# Patient Record
Sex: FEMALE | Race: WHITE | Employment: OTHER | URBAN - METROPOLITAN AREA
[De-identification: names, ages, dates, MRNs, and addresses within clinical notes are randomized per-mention and may not be internally consistent; named-entity substitution may affect disease eponyms.]

---

## 2023-10-11 RX ORDER — IRBESARTAN 150 MG/1
1 TABLET ORAL DAILY
COMMUNITY
Start: 2023-08-10

## 2023-10-11 RX ORDER — GLYCOPYRROLATE 2 MG/1
2 TABLET ORAL DAILY
COMMUNITY

## 2023-10-11 RX ORDER — DIMENHYDRINATE 50 MG
TABLET ORAL DAILY
COMMUNITY

## 2023-10-11 RX ORDER — CHOLECALCIFEROL (VITAMIN D3) 125 MCG
CAPSULE ORAL
COMMUNITY

## 2023-10-11 RX ORDER — CHLORAL HYDRATE 500 MG
CAPSULE ORAL
COMMUNITY

## 2023-10-11 RX ORDER — GABAPENTIN 300 MG/1
300 CAPSULE ORAL DAILY
Qty: 30 CAPSULE | Refills: 2 | COMMUNITY
Start: 2023-09-13 | End: 2023-12-12

## 2023-10-11 RX ORDER — PANTOPRAZOLE SODIUM 40 MG/1
40 TABLET, DELAYED RELEASE ORAL
COMMUNITY

## 2023-10-11 RX ORDER — ONDANSETRON 4 MG/1
4 TABLET, FILM COATED ORAL EVERY 8 HOURS PRN
COMMUNITY
Start: 2023-09-05

## 2023-10-11 RX ORDER — BUSPIRONE HYDROCHLORIDE 10 MG/1
1 TABLET ORAL 2 TIMES DAILY
COMMUNITY
Start: 2023-04-21

## 2023-10-11 RX ORDER — ZOLPIDEM TARTRATE 10 MG/1
10 TABLET ORAL NIGHTLY
Qty: 30 TABLET | Refills: 2 | COMMUNITY
Start: 2023-08-07 | End: 2023-11-05

## 2023-10-11 NOTE — PROGRESS NOTES
[facility] ENDOSCOPY COLONOSCOPY PRE-OPERATIVE INSTRUCTIONS    Procedure date_10/18/2023________Arrival time__0800__________        Surgery time___0900_________       Clear liquids the day before the procedure. Do not eat or drink anything within 5 hours of your procedure. This includes water chewing gum, mints and ice chips. You may brush your teeth and gargle the morning of your surgery, but do not swallow the water    You may be asked to stop blood thinners such as Coumadin, Plavix, Fragmin, Lovenox, etc., or any anti-inflammatories such as:  Aspirin, Ibuprofen, Advil, Naproxen prior to your procedure. We also ask that you stop any OTC medications such as fish oil, vitamin E, glucosamine, garlic, Multivitamins, COQ 10, etc.    You must make arrangements for a responsible adult to arrive with you and stay in our waiting area during your procedure. They will also need to take you home after your procedure. For your safety you will not be allowed to leave alone or drive yourself home. Also for your safety, it is strongly suggested that someone stay with you the first 24 hours after your procedure. For your comfort, please wear simple loose fitting clothing to the center. Please do not bring valuables. If you have a living will and a durable power of  for healthcare, please bring in a copy.      You will need to bring a photo ID and insurance card    Our goal is to provide you with excellent care so if you have any questions, please contact us at the [facility] at [phone]         Please note these are generalized instructions for all colonoscopy cases, you may be provided with more specific instructions if necessary

## 2023-10-17 ENCOUNTER — ANESTHESIA EVENT (OUTPATIENT)
Dept: ENDOSCOPY | Age: 73
End: 2023-10-17
Payer: MEDICARE

## 2023-10-18 ENCOUNTER — ANESTHESIA (OUTPATIENT)
Dept: ENDOSCOPY | Age: 73
End: 2023-10-18
Payer: MEDICARE

## 2023-10-18 ENCOUNTER — HOSPITAL ENCOUNTER (OUTPATIENT)
Age: 73
Setting detail: OUTPATIENT SURGERY
Discharge: HOME | End: 2023-10-18
Attending: INTERNAL MEDICINE | Admitting: INTERNAL MEDICINE
Payer: MEDICARE

## 2023-10-18 VITALS
RESPIRATION RATE: 17 BRPM | SYSTOLIC BLOOD PRESSURE: 104 MMHG | TEMPERATURE: 97 F | HEIGHT: 62 IN | WEIGHT: 151.01 LBS | DIASTOLIC BLOOD PRESSURE: 92 MMHG | OXYGEN SATURATION: 97 % | BODY MASS INDEX: 27.79 KG/M2 | HEART RATE: 91 BPM

## 2023-10-18 DIAGNOSIS — K58.2 IRRITABLE BOWEL SYNDROME WITH BOTH CONSTIPATION AND DIARRHEA: ICD-10-CM

## 2023-10-18 DIAGNOSIS — K21.9 GASTROESOPHAGEAL REFLUX DISEASE, UNSPECIFIED WHETHER ESOPHAGITIS PRESENT: ICD-10-CM

## 2023-10-18 PROCEDURE — 3700000001 HC ADD 15 MINUTES (ANESTHESIA): Performed by: INTERNAL MEDICINE

## 2023-10-18 PROCEDURE — 7100000011 HC PHASE II RECOVERY - ADDTL 15 MIN: Performed by: INTERNAL MEDICINE

## 2023-10-18 PROCEDURE — 7100000010 HC PHASE II RECOVERY - FIRST 15 MIN: Performed by: INTERNAL MEDICINE

## 2023-10-18 PROCEDURE — 3609010600 HC COLONOSCOPY POLYPECTOMY SNARE/COLD BIOPSY: Performed by: INTERNAL MEDICINE

## 2023-10-18 PROCEDURE — 6360000002 HC RX W HCPCS: Performed by: NURSE ANESTHETIST, CERTIFIED REGISTERED

## 2023-10-18 PROCEDURE — 2580000003 HC RX 258: Performed by: ANESTHESIOLOGY

## 2023-10-18 PROCEDURE — 3700000000 HC ANESTHESIA ATTENDED CARE: Performed by: INTERNAL MEDICINE

## 2023-10-18 PROCEDURE — 88305 TISSUE EXAM BY PATHOLOGIST: CPT

## 2023-10-18 PROCEDURE — 3609012400 HC EGD TRANSORAL BIOPSY SINGLE/MULTIPLE: Performed by: INTERNAL MEDICINE

## 2023-10-18 PROCEDURE — 2709999900 HC NON-CHARGEABLE SUPPLY: Performed by: INTERNAL MEDICINE

## 2023-10-18 RX ORDER — SODIUM CHLORIDE 0.9 % (FLUSH) 0.9 %
5-40 SYRINGE (ML) INJECTION PRN
Status: DISCONTINUED | OUTPATIENT
Start: 2023-10-18 | End: 2023-10-18 | Stop reason: HOSPADM

## 2023-10-18 RX ORDER — SODIUM CHLORIDE 0.9 % (FLUSH) 0.9 %
5-40 SYRINGE (ML) INJECTION EVERY 12 HOURS SCHEDULED
Status: DISCONTINUED | OUTPATIENT
Start: 2023-10-18 | End: 2023-10-18 | Stop reason: HOSPADM

## 2023-10-18 RX ORDER — MIDAZOLAM HYDROCHLORIDE 1 MG/ML
INJECTION INTRAMUSCULAR; INTRAVENOUS PRN
Status: DISCONTINUED | OUTPATIENT
Start: 2023-10-18 | End: 2023-10-18 | Stop reason: SDUPTHER

## 2023-10-18 RX ORDER — SODIUM CHLORIDE 9 MG/ML
INJECTION, SOLUTION INTRAVENOUS PRN
Status: DISCONTINUED | OUTPATIENT
Start: 2023-10-18 | End: 2023-10-18 | Stop reason: HOSPADM

## 2023-10-18 RX ORDER — PROPOFOL 10 MG/ML
INJECTION, EMULSION INTRAVENOUS CONTINUOUS PRN
Status: DISCONTINUED | OUTPATIENT
Start: 2023-10-18 | End: 2023-10-18 | Stop reason: SDUPTHER

## 2023-10-18 RX ADMIN — MIDAZOLAM 1 MG: 1 INJECTION INTRAMUSCULAR; INTRAVENOUS at 09:10

## 2023-10-18 RX ADMIN — MIDAZOLAM 1 MG: 1 INJECTION INTRAMUSCULAR; INTRAVENOUS at 09:05

## 2023-10-18 RX ADMIN — SODIUM CHLORIDE: 9 INJECTION, SOLUTION INTRAVENOUS at 08:38

## 2023-10-18 RX ADMIN — PROPOFOL 250 MCG/KG/MIN: 10 INJECTION, EMULSION INTRAVENOUS at 09:10

## 2023-10-18 ASSESSMENT — PAIN - FUNCTIONAL ASSESSMENT: PAIN_FUNCTIONAL_ASSESSMENT: 0-10

## 2023-10-18 NOTE — ANESTHESIA PRE PROCEDURE
ASA 2     (Discussed risks and benefits to sedation including nausea, vomiting, allergic reaction, headache, delayed cognitive recovery, stroke, heart attack, respiratory depression, and death which patient understood and agreed to proceed. The patient was given the opportunity to ask questions and all questions were answered to the patient's satisfaction.  )  Induction: intravenous. Anesthetic plan and risks discussed with patient. Plan discussed with CRNA. This pre-anesthesia assessment may be used as a history and physical.    DOS STAFF ADDENDUM:    Pt seen and examined, chart reviewed (including anesthesia, drug and allergy history). No interval changes to history and physical examination. Anesthetic plan, risks, benefits, alternatives, and personnel involved discussed with patient. Patient verbalized an understanding and agrees to proceed.       Scott Browne MD  October 18, 2023  8:48 AM

## 2023-10-18 NOTE — ANESTHESIA POSTPROCEDURE EVALUATION
Department of Anesthesiology  Postprocedure Note    Patient: Gwen Jacome  MRN: [id number]  YOB: 1950  Date of evaluation: 10/18/2023      Procedure Summary     Date: 10/18/23 Room / Location: [address] [place] [address]    Anesthesia Start: 9302 Anesthesia Stop: 0944    Procedures:       COLONOSCOPY POLYPECTOMY SNARE/COLD BIOPSY      EGD BIOPSY Diagnosis:       Irritable bowel syndrome with both constipation and diarrhea      Gastroesophageal reflux disease, unspecified whether esophagitis present      (Irritable bowel syndrome with both constipation and diarrhea [K58.2])      (Gastroesophageal reflux disease, unspecified whether esophagitis present [K21.9])    Surgeons: Angie Juarez MD Responsible Provider: Debra Reid MD    Anesthesia Type: MAC ASA Status: 2          Anesthesia Type: No value filed. Apolonia Phase I: Apolonia Score: 10    Apolonia Phase II: Apolonia Score: 10      Anesthesia Post Evaluation    Patient location during evaluation: PACU  Patient participation: complete - patient participated  Level of consciousness: awake  Airway patency: patent  Nausea & Vomiting: no nausea and no vomiting  Cardiovascular status: blood pressure returned to baseline  Respiratory status: acceptable  Hydration status: stable  Comments: Vital signs stable  OK to discharge from Stage I post anesthesia care.   Care transferred from Anesthesiology department on discharge from perioperative area   Multimodal analgesia pain management approach  Pain management: satisfactory to patient

## 2023-10-18 NOTE — DISCHARGE INSTRUCTIONS
We removed 1 small polyp. You also have small hemorrhoids and some redness in the stomach. Recommendations:   Await pathology results   Due to age, do not recommend further screening colonoscopies   Continue current medications and resume diet as tolerated   Follow up in the office in 4 weeks       Endoscopy Discharge Instructions      You may be drowsy or lightheaded after receiving sedation. DO NOT operate  a vehicle (automobile, bicycle, motorcycle, machinery, or power tools), no  alcoholic beverages, and do not make any important decisions today. Plan on bed rest or quiet relaxation today. Resume normal activities in the morning. Resume normal activity tomorrow unless otherwise advised by your physician. Eat a light first meal, avoiding spicy and fatty foods, then resume normal diet unless  you are told otherwise by your physician. If the intravenous medication site is painful, apply warm compresses on the site until the soreness is relieved and elevate the arm above the heart. Call your physician if no improvement  in 2-3 days. POSSIBLE SYMPTOMS TO WATCH:     1. fever (greater than 100) 5. increased abdominal bloating   2. severe pain   6. excessive bleeding   3. nausea and vomiting  7. chest pain   4. chills    8. shortness of breath       Expected as normal and remedies:  Sore throat: use over the counter throat lozenges or gargle with warm salt water. Redness or soreness at the IV site: apply warm compress  Gaseous discomfort: belching or passing flatus (gas). Discharge Instructions for Colonoscopy     Colonoscopy is a visual exam of the lining of the large intestine, also called the bowel or colon, with a colonoscope. A colonoscope is a flexible tube with a light and a viewing device. It allows the doctor to view the inside of the colon through a tiny video camera.      Colonoscopy is performed for many reasons: unexplained anemia , pain,

## 2023-10-18 NOTE — H&P
Pre-operative History and Physical    Patient: Val Shelton  : 1950  Acct#:     Intended Procedure:  EGD and colonoscopy     HISTORY OF PRESENT ILLNESS:  The patient is a 75 y.o. female  who presents for/due to history of colonic polyps, and recurrent belching/reflux. Past Medical History:        Diagnosis Date    Acid reflux     Depression     Hypertension     IBS (irritable bowel syndrome)      Past Surgical History:        Procedure Laterality Date    BACK SURGERY      cervical    BREAST SURGERY      lumpectomy    COLONOSCOPY      FOOT SURGERY Left     HYSTERECTOMY (CERVIX STATUS UNKNOWN)      SHOULDER SURGERY Right     rotator cuff     Medications Prior to Admission:   No current facility-administered medications on file prior to encounter. Current Outpatient Medications on File Prior to Encounter   Medication Sig Dispense Refill    busPIRone (BUSPAR) 10 MG tablet Take 1 tablet by mouth 2 times daily      gabapentin (NEURONTIN) 300 MG capsule Take 1 capsule by mouth daily. 30 capsule 2    irbesartan (AVAPRO) 150 MG tablet Take 1 tablet by mouth daily      ondansetron (ZOFRAN) 4 MG tablet Take 1 tablet by mouth every 8 hours as needed      zolpidem (AMBIEN) 10 MG tablet Take 1 tablet by mouth nightly.  30 tablet 2    Valbenazine Tosylate 40 MG CAPS Take 1 capsule by mouth daily      METAMUCIL FIBER PO Take by mouth      Ascorbic Acid (VITAMIN C) 500 MG CHEW Take 1 tablet by mouth daily      Calcium Carb-Cholecalciferol 600-20 MG-MCG CHEW Take by mouth Every Day      cyanocobalamin 500 MCG tablet Take 1 tablet by mouth once a week      Flaxseed, Linseed, (FLAX SEED OIL) 1000 MG CAPS Take by mouth daily      glycopyrrolate (ROBINUL) 2 MG tablet Take 1 tablet by mouth daily      melatonin 5 MG TABS tablet Take by mouth      pantoprazole (PROTONIX) 40 MG tablet Take 1 tablet by mouth      Omega-3 Fatty Acids (FISH OIL) 1000 MG capsule Take by mouth Every Day          Allergies:

## 2023-10-18 NOTE — OP NOTE
are described below. Appropriate photo documentation was obtained. Terminal ileum:  Normal ileum    The scope was then withdrawn back through the cecum, ascending, transverse, descending, sigmoid colon, and rectum. Careful circumferential examination of the mucosa in these areas demonstrated:    - 3mm sessile polyp In the sigmoid colon removed with cold snare polypectomy       Rectum: The scope was then withdrawn into the rectum and retroflexed. The retroflexed view of the anal verge and rectum demonstrates small internal and external hemorrhoids . The scope was straightened, the colon was decompressed and the scope was withdrawn from the patient. The patient tolerated the procedure well and was taken to the PACU in good condition. Estimated blood loss:  None=    ID Type Source Tests Collected by Time Destination   A : A. Small bowel bx Tissue Tissue SURGICAL PATHOLOGY Maricarmen Bazan MD 10/18/2023 5745    B : B.  Gastric bx Tissue Tissue SURGICAL PATHOLOGY Maricarmen Bazan MD 10/18/2023 0262    C : C. GE junction Tissue Tissue SURGICAL PATHOLOGY Maricarmen Bazan MD 10/18/2023 0919        Impression:    1) See post procedure diagnoses    Recommendations:   Await pathology results   Due to age, do not recommend further screening colonoscopies   Continue current medications and resume diet as tolerated   Follow up in the office in 4 weeks       Maricarmen Bazan MD  [address] [facility] and [address]

## 2024-10-06 ENCOUNTER — APPOINTMENT (OUTPATIENT)
Dept: GENERAL RADIOLOGY | Age: 74
End: 2024-10-06
Payer: MEDICARE

## 2024-10-06 ENCOUNTER — APPOINTMENT (OUTPATIENT)
Dept: CT IMAGING | Age: 74
End: 2024-10-06
Payer: MEDICARE

## 2024-10-06 ENCOUNTER — HOSPITAL ENCOUNTER (INPATIENT)
Age: 74
LOS: 5 days | Discharge: NURSING, REHAB OR LONG-TERM CARE | End: 2024-10-11
Attending: EMERGENCY MEDICINE | Admitting: INTERNAL MEDICINE
Payer: MEDICARE

## 2024-10-06 DIAGNOSIS — E87.1 HYPONATREMIA: ICD-10-CM

## 2024-10-06 DIAGNOSIS — S82.891A CLOSED FRACTURE DISLOCATION OF RIGHT ANKLE, INITIAL ENCOUNTER: Primary | ICD-10-CM

## 2024-10-06 PROBLEM — I10 ESSENTIAL HYPERTENSION: Status: ACTIVE | Noted: 2024-10-06

## 2024-10-06 LAB
ALBUMIN SERPL-MCNC: 4.4 G/DL (ref 3.4–5)
ALBUMIN/GLOB SERPL: 1.6 {RATIO} (ref 1.1–2.2)
ALP SERPL-CCNC: 74 U/L (ref 40–129)
ALT SERPL-CCNC: 18 U/L (ref 10–40)
ANION GAP SERPL CALCULATED.3IONS-SCNC: 11 MMOL/L (ref 3–16)
ANION GAP SERPL CALCULATED.3IONS-SCNC: 15 MMOL/L (ref 3–16)
ANION GAP SERPL CALCULATED.3IONS-SCNC: 17 MMOL/L (ref 3–16)
AST SERPL-CCNC: 27 U/L (ref 15–37)
BACTERIA URNS QL MICRO: ABNORMAL /HPF
BACTERIA URNS QL MICRO: ABNORMAL /HPF
BASOPHILS # BLD: 0 K/UL (ref 0–0.2)
BASOPHILS NFR BLD: 0.3 %
BILIRUB SERPL-MCNC: <0.2 MG/DL (ref 0–1)
BILIRUB UR QL STRIP.AUTO: NEGATIVE
BILIRUB UR QL STRIP.AUTO: NEGATIVE
BUN SERPL-MCNC: 12 MG/DL (ref 7–20)
BUN SERPL-MCNC: 9 MG/DL (ref 7–20)
BUN SERPL-MCNC: 9 MG/DL (ref 7–20)
CALCIUM SERPL-MCNC: 8.7 MG/DL (ref 8.3–10.6)
CALCIUM SERPL-MCNC: 8.9 MG/DL (ref 8.3–10.6)
CALCIUM SERPL-MCNC: 9.4 MG/DL (ref 8.3–10.6)
CHLORIDE SERPL-SCNC: 86 MMOL/L (ref 99–110)
CHLORIDE SERPL-SCNC: 87 MMOL/L (ref 99–110)
CHLORIDE SERPL-SCNC: 89 MMOL/L (ref 99–110)
CHLORIDE UR-SCNC: 22 MMOL/L
CLARITY UR: CLEAR
CLARITY UR: CLEAR
CO2 SERPL-SCNC: 20 MMOL/L (ref 21–32)
CO2 SERPL-SCNC: 21 MMOL/L (ref 21–32)
CO2 SERPL-SCNC: 23 MMOL/L (ref 21–32)
COLOR UR: YELLOW
COLOR UR: YELLOW
CORTIS AM PEAK SERPL-MCNC: 41.9 UG/DL (ref 4.3–22.4)
CREAT SERPL-MCNC: 0.6 MG/DL (ref 0.6–1.2)
CREAT SERPL-MCNC: 0.6 MG/DL (ref 0.6–1.2)
CREAT SERPL-MCNC: <0.5 MG/DL (ref 0.6–1.2)
CREAT UR-MCNC: 47 MG/DL (ref 28–259)
D-DIMER QUANTITATIVE: 0.48 UG/ML FEU (ref 0–0.6)
DEPRECATED RDW RBC AUTO: 13.2 % (ref 12.4–15.4)
EOSINOPHIL # BLD: 0 K/UL (ref 0–0.6)
EOSINOPHIL NFR BLD: 0.7 %
EPI CELLS #/AREA URNS AUTO: 2 /HPF (ref 0–5)
EPI CELLS #/AREA URNS AUTO: 5 /HPF (ref 0–5)
EST. AVERAGE GLUCOSE BLD GHB EST-MCNC: 111.2 MG/DL
ETHANOLAMINE SERPL-MCNC: 65 MG/DL (ref 0–0.08)
GFR SERPLBLD CREATININE-BSD FMLA CKD-EPI: >90 ML/MIN/{1.73_M2}
GLUCOSE SERPL-MCNC: 114 MG/DL (ref 70–99)
GLUCOSE SERPL-MCNC: 122 MG/DL (ref 70–99)
GLUCOSE SERPL-MCNC: 131 MG/DL (ref 70–99)
GLUCOSE UR STRIP.AUTO-MCNC: NEGATIVE MG/DL
GLUCOSE UR STRIP.AUTO-MCNC: NEGATIVE MG/DL
HBA1C MFR BLD: 5.5 %
HCT VFR BLD AUTO: 35.9 % (ref 36–48)
HGB BLD-MCNC: 12.3 G/DL (ref 12–16)
HGB UR QL STRIP.AUTO: NEGATIVE
HGB UR QL STRIP.AUTO: NEGATIVE
HYALINE CASTS #/AREA URNS AUTO: 0 /LPF (ref 0–8)
HYALINE CASTS #/AREA URNS AUTO: 6 /LPF (ref 0–8)
KETONES UR STRIP.AUTO-MCNC: NEGATIVE MG/DL
KETONES UR STRIP.AUTO-MCNC: NEGATIVE MG/DL
LEUKOCYTE ESTERASE UR QL STRIP.AUTO: ABNORMAL
LEUKOCYTE ESTERASE UR QL STRIP.AUTO: ABNORMAL
LYMPHOCYTES # BLD: 1.4 K/UL (ref 1–5.1)
LYMPHOCYTES NFR BLD: 23.1 %
MAGNESIUM SERPL-MCNC: 1.89 MG/DL (ref 1.8–2.4)
MCH RBC QN AUTO: 33.9 PG (ref 26–34)
MCHC RBC AUTO-ENTMCNC: 34.1 G/DL (ref 31–36)
MCV RBC AUTO: 99.2 FL (ref 80–100)
MONOCYTES # BLD: 0.7 K/UL (ref 0–1.3)
MONOCYTES NFR BLD: 11.3 %
NEUTROPHILS # BLD: 4 K/UL (ref 1.7–7.7)
NEUTROPHILS NFR BLD: 64.6 %
NITRITE UR QL STRIP.AUTO: NEGATIVE
NITRITE UR QL STRIP.AUTO: NEGATIVE
NT-PROBNP SERPL-MCNC: 515 PG/ML (ref 0–449)
OSMOLALITY SERPL: 265 MOSM/KG (ref 280–301)
OSMOLALITY SERPL: 281 MOSM/KG (ref 280–301)
OSMOLALITY UR: 350 MOSM/KG (ref 390–1070)
OSMOLALITY UR: 627 MOSM/KG (ref 390–1070)
PH UR STRIP.AUTO: 5.5 [PH] (ref 5–8)
PH UR STRIP.AUTO: 5.5 [PH] (ref 5–8)
PLATELET # BLD AUTO: 200 K/UL (ref 135–450)
PMV BLD AUTO: 7.3 FL (ref 5–10.5)
POTASSIUM SERPL-SCNC: 3.5 MMOL/L (ref 3.5–5.1)
POTASSIUM SERPL-SCNC: 4.4 MMOL/L (ref 3.5–5.1)
POTASSIUM SERPL-SCNC: 4.7 MMOL/L (ref 3.5–5.1)
POTASSIUM UR-SCNC: 87.5 MMOL/L
PROCALCITONIN SERPL IA-MCNC: 0.04 NG/ML (ref 0–0.15)
PROT SERPL-MCNC: 7.1 G/DL (ref 6.4–8.2)
PROT UR STRIP.AUTO-MCNC: 30 MG/DL
PROT UR STRIP.AUTO-MCNC: 30 MG/DL
PROT UR-MCNC: 0.03 G/DL
PROT UR-MCNC: 29.6 MG/DL
RBC # BLD AUTO: 3.62 M/UL (ref 4–5.2)
RBC CLUMPS #/AREA URNS AUTO: 0 /HPF (ref 0–4)
RBC CLUMPS #/AREA URNS AUTO: 2 /HPF (ref 0–4)
SODIUM SERPL-SCNC: 120 MMOL/L (ref 136–145)
SODIUM SERPL-SCNC: 121 MMOL/L (ref 136–145)
SODIUM SERPL-SCNC: 122 MMOL/L (ref 136–145)
SODIUM SERPL-SCNC: 123 MMOL/L (ref 136–145)
SODIUM SERPL-SCNC: 124 MMOL/L (ref 136–145)
SODIUM UR-SCNC: 47 MMOL/L
SODIUM UR-SCNC: <20 MMOL/L
SP GR UR STRIP.AUTO: 1.01 (ref 1–1.03)
SP GR UR STRIP.AUTO: 1.02 (ref 1–1.03)
TROPONIN, HIGH SENSITIVITY: 9 NG/L (ref 0–14)
TSH SERPL DL<=0.005 MIU/L-ACNC: 2.59 UIU/ML (ref 0.27–4.2)
UA COMPLETE W REFLEX CULTURE PNL UR: ABNORMAL
UA DIPSTICK W REFLEX MICRO PNL UR: YES
UA DIPSTICK W REFLEX MICRO PNL UR: YES
URATE SERPL-MCNC: 5 MG/DL (ref 2.6–6)
URN SPEC COLLECT METH UR: ABNORMAL
URN SPEC COLLECT METH UR: ABNORMAL
UROBILINOGEN UR STRIP-ACNC: 0.2 E.U./DL
UROBILINOGEN UR STRIP-ACNC: 1 E.U./DL
WBC # BLD AUTO: 6.1 K/UL (ref 4–11)
WBC #/AREA URNS AUTO: 3 /HPF (ref 0–5)
WBC #/AREA URNS AUTO: 5 /HPF (ref 0–5)

## 2024-10-06 PROCEDURE — 6360000002 HC RX W HCPCS: Performed by: INTERNAL MEDICINE

## 2024-10-06 PROCEDURE — 6360000002 HC RX W HCPCS: Performed by: PHYSICIAN ASSISTANT

## 2024-10-06 PROCEDURE — 85025 COMPLETE CBC W/AUTO DIFF WBC: CPT

## 2024-10-06 PROCEDURE — 83930 ASSAY OF BLOOD OSMOLALITY: CPT

## 2024-10-06 PROCEDURE — 84145 PROCALCITONIN (PCT): CPT

## 2024-10-06 PROCEDURE — 2580000003 HC RX 258: Performed by: INTERNAL MEDICINE

## 2024-10-06 PROCEDURE — 6370000000 HC RX 637 (ALT 250 FOR IP)

## 2024-10-06 PROCEDURE — 84295 ASSAY OF SERUM SODIUM: CPT

## 2024-10-06 PROCEDURE — 85379 FIBRIN DEGRADATION QUANT: CPT

## 2024-10-06 PROCEDURE — 73700 CT LOWER EXTREMITY W/O DYE: CPT

## 2024-10-06 PROCEDURE — 83735 ASSAY OF MAGNESIUM: CPT

## 2024-10-06 PROCEDURE — 94760 N-INVAS EAR/PLS OXIMETRY 1: CPT

## 2024-10-06 PROCEDURE — 6360000002 HC RX W HCPCS

## 2024-10-06 PROCEDURE — 96374 THER/PROPH/DIAG INJ IV PUSH: CPT

## 2024-10-06 PROCEDURE — 73600 X-RAY EXAM OF ANKLE: CPT

## 2024-10-06 PROCEDURE — 36415 COLL VENOUS BLD VENIPUNCTURE: CPT

## 2024-10-06 PROCEDURE — 84300 ASSAY OF URINE SODIUM: CPT

## 2024-10-06 PROCEDURE — 83036 HEMOGLOBIN GLYCOSYLATED A1C: CPT

## 2024-10-06 PROCEDURE — 70450 CT HEAD/BRAIN W/O DYE: CPT

## 2024-10-06 PROCEDURE — 99285 EMERGENCY DEPT VISIT HI MDM: CPT

## 2024-10-06 PROCEDURE — 6370000000 HC RX 637 (ALT 250 FOR IP): Performed by: INTERNAL MEDICINE

## 2024-10-06 PROCEDURE — 82570 ASSAY OF URINE CREATININE: CPT

## 2024-10-06 PROCEDURE — 83935 ASSAY OF URINE OSMOLALITY: CPT

## 2024-10-06 PROCEDURE — 84133 ASSAY OF URINE POTASSIUM: CPT

## 2024-10-06 PROCEDURE — 2500000003 HC RX 250 WO HCPCS: Performed by: EMERGENCY MEDICINE

## 2024-10-06 PROCEDURE — 82436 ASSAY OF URINE CHLORIDE: CPT

## 2024-10-06 PROCEDURE — 84155 ASSAY OF PROTEIN SERUM: CPT

## 2024-10-06 PROCEDURE — 84550 ASSAY OF BLOOD/URIC ACID: CPT

## 2024-10-06 PROCEDURE — 2580000003 HC RX 258: Performed by: EMERGENCY MEDICINE

## 2024-10-06 PROCEDURE — 83880 ASSAY OF NATRIURETIC PEPTIDE: CPT

## 2024-10-06 PROCEDURE — 2000000000 HC ICU R&B

## 2024-10-06 PROCEDURE — 6360000002 HC RX W HCPCS: Performed by: EMERGENCY MEDICINE

## 2024-10-06 PROCEDURE — 80053 COMPREHEN METABOLIC PANEL: CPT

## 2024-10-06 PROCEDURE — 84165 PROTEIN E-PHORESIS SERUM: CPT

## 2024-10-06 PROCEDURE — 96375 TX/PRO/DX INJ NEW DRUG ADDON: CPT

## 2024-10-06 PROCEDURE — 71045 X-RAY EXAM CHEST 1 VIEW: CPT

## 2024-10-06 PROCEDURE — 84484 ASSAY OF TROPONIN QUANT: CPT

## 2024-10-06 PROCEDURE — 51798 US URINE CAPACITY MEASURE: CPT

## 2024-10-06 PROCEDURE — 84156 ASSAY OF PROTEIN URINE: CPT

## 2024-10-06 PROCEDURE — 96376 TX/PRO/DX INJ SAME DRUG ADON: CPT

## 2024-10-06 PROCEDURE — 82077 ASSAY SPEC XCP UR&BREATH IA: CPT

## 2024-10-06 PROCEDURE — 84443 ASSAY THYROID STIM HORMONE: CPT

## 2024-10-06 PROCEDURE — 84166 PROTEIN E-PHORESIS/URINE/CSF: CPT

## 2024-10-06 PROCEDURE — 81001 URINALYSIS AUTO W/SCOPE: CPT

## 2024-10-06 PROCEDURE — 82533 TOTAL CORTISOL: CPT

## 2024-10-06 PROCEDURE — 73560 X-RAY EXAM OF KNEE 1 OR 2: CPT

## 2024-10-06 RX ORDER — ACETAMINOPHEN 500 MG
1000 TABLET ORAL EVERY 8 HOURS SCHEDULED
Status: DISCONTINUED | OUTPATIENT
Start: 2024-10-06 | End: 2024-10-08

## 2024-10-06 RX ORDER — ACETAMINOPHEN 325 MG/1
650 TABLET ORAL EVERY 6 HOURS PRN
Status: DISCONTINUED | OUTPATIENT
Start: 2024-10-06 | End: 2024-10-11 | Stop reason: HOSPADM

## 2024-10-06 RX ORDER — ZOLPIDEM TARTRATE 5 MG/1
10 TABLET ORAL NIGHTLY PRN
Status: DISCONTINUED | OUTPATIENT
Start: 2024-10-06 | End: 2024-10-11 | Stop reason: HOSPADM

## 2024-10-06 RX ORDER — ONDANSETRON 2 MG/ML
4 INJECTION INTRAMUSCULAR; INTRAVENOUS ONCE
Status: COMPLETED | OUTPATIENT
Start: 2024-10-06 | End: 2024-10-06

## 2024-10-06 RX ORDER — GABAPENTIN 300 MG/1
300 CAPSULE ORAL 2 TIMES DAILY
Status: DISCONTINUED | OUTPATIENT
Start: 2024-10-06 | End: 2024-10-06

## 2024-10-06 RX ORDER — GABAPENTIN 300 MG/1
300 CAPSULE ORAL EVERY OTHER DAY
Status: DISCONTINUED | OUTPATIENT
Start: 2024-10-07 | End: 2024-10-11 | Stop reason: HOSPADM

## 2024-10-06 RX ORDER — POTASSIUM CHLORIDE 7.45 MG/ML
10 INJECTION INTRAVENOUS PRN
Status: DISCONTINUED | OUTPATIENT
Start: 2024-10-06 | End: 2024-10-11 | Stop reason: HOSPADM

## 2024-10-06 RX ORDER — MIRABEGRON 25 MG/1
25 TABLET, FILM COATED, EXTENDED RELEASE ORAL DAILY
COMMUNITY

## 2024-10-06 RX ORDER — CALCIUM CARBONATE 500(1250)
500 TABLET ORAL DAILY
COMMUNITY

## 2024-10-06 RX ORDER — ENOXAPARIN SODIUM 100 MG/ML
40 INJECTION SUBCUTANEOUS DAILY
Status: DISCONTINUED | OUTPATIENT
Start: 2024-10-06 | End: 2024-10-11 | Stop reason: HOSPADM

## 2024-10-06 RX ORDER — GABAPENTIN 300 MG/1
300 CAPSULE ORAL EVERY OTHER DAY
COMMUNITY

## 2024-10-06 RX ORDER — PANTOPRAZOLE SODIUM 40 MG/1
40 TABLET, DELAYED RELEASE ORAL
Status: DISCONTINUED | OUTPATIENT
Start: 2024-10-06 | End: 2024-10-11 | Stop reason: HOSPADM

## 2024-10-06 RX ORDER — PROCHLORPERAZINE EDISYLATE 5 MG/ML
10 INJECTION INTRAMUSCULAR; INTRAVENOUS EVERY 6 HOURS PRN
Status: DISCONTINUED | OUTPATIENT
Start: 2024-10-06 | End: 2024-10-11 | Stop reason: HOSPADM

## 2024-10-06 RX ORDER — SODIUM CHLORIDE 9 MG/ML
INJECTION, SOLUTION INTRAVENOUS CONTINUOUS
Status: ACTIVE | OUTPATIENT
Start: 2024-10-06 | End: 2024-10-06

## 2024-10-06 RX ORDER — SOLIFENACIN SUCCINATE 10 MG/1
10 TABLET, FILM COATED ORAL DAILY
COMMUNITY

## 2024-10-06 RX ORDER — SODIUM CHLORIDE 0.9 % (FLUSH) 0.9 %
5-40 SYRINGE (ML) INJECTION PRN
Status: DISCONTINUED | OUTPATIENT
Start: 2024-10-06 | End: 2024-10-11 | Stop reason: HOSPADM

## 2024-10-06 RX ORDER — LANOLIN ALCOHOL/MO/W.PET/CERES
6 CREAM (GRAM) TOPICAL NIGHTLY PRN
Status: DISCONTINUED | OUTPATIENT
Start: 2024-10-06 | End: 2024-10-11 | Stop reason: HOSPADM

## 2024-10-06 RX ORDER — MAGNESIUM SULFATE IN WATER 40 MG/ML
2000 INJECTION, SOLUTION INTRAVENOUS PRN
Status: DISCONTINUED | OUTPATIENT
Start: 2024-10-06 | End: 2024-10-11 | Stop reason: HOSPADM

## 2024-10-06 RX ORDER — ACETAMINOPHEN 650 MG/1
650 SUPPOSITORY RECTAL EVERY 6 HOURS PRN
Status: DISCONTINUED | OUTPATIENT
Start: 2024-10-06 | End: 2024-10-11 | Stop reason: HOSPADM

## 2024-10-06 RX ORDER — POTASSIUM CHLORIDE 1500 MG/1
40 TABLET, EXTENDED RELEASE ORAL PRN
Status: DISCONTINUED | OUTPATIENT
Start: 2024-10-06 | End: 2024-10-11 | Stop reason: HOSPADM

## 2024-10-06 RX ORDER — ONDANSETRON 4 MG/1
4 TABLET, ORALLY DISINTEGRATING ORAL EVERY 8 HOURS PRN
Status: DISCONTINUED | OUTPATIENT
Start: 2024-10-06 | End: 2024-10-11 | Stop reason: HOSPADM

## 2024-10-06 RX ORDER — PROPOFOL 10 MG/ML
1 INJECTION, EMULSION INTRAVENOUS ONCE
Status: COMPLETED | OUTPATIENT
Start: 2024-10-06 | End: 2024-10-06

## 2024-10-06 RX ORDER — BUSPIRONE HYDROCHLORIDE 10 MG/1
10 TABLET ORAL 2 TIMES DAILY
Status: DISCONTINUED | OUTPATIENT
Start: 2024-10-06 | End: 2024-10-11 | Stop reason: HOSPADM

## 2024-10-06 RX ORDER — KETOROLAC TROMETHAMINE 15 MG/ML
15 INJECTION, SOLUTION INTRAMUSCULAR; INTRAVENOUS EVERY 6 HOURS PRN
Status: DISPENSED | OUTPATIENT
Start: 2024-10-06 | End: 2024-10-11

## 2024-10-06 RX ORDER — SODIUM CHLORIDE 9 MG/ML
INJECTION, SOLUTION INTRAVENOUS PRN
Status: DISCONTINUED | OUTPATIENT
Start: 2024-10-06 | End: 2024-10-11 | Stop reason: HOSPADM

## 2024-10-06 RX ORDER — ETOMIDATE 2 MG/ML
0.3 INJECTION INTRAVENOUS ONCE
Status: DISCONTINUED | OUTPATIENT
Start: 2024-10-06 | End: 2024-10-06

## 2024-10-06 RX ORDER — ONDANSETRON 2 MG/ML
4 INJECTION INTRAMUSCULAR; INTRAVENOUS EVERY 6 HOURS PRN
Status: DISCONTINUED | OUTPATIENT
Start: 2024-10-06 | End: 2024-10-11 | Stop reason: HOSPADM

## 2024-10-06 RX ORDER — ZOLPIDEM TARTRATE 10 MG/1
10 TABLET ORAL NIGHTLY PRN
COMMUNITY

## 2024-10-06 RX ORDER — TROSPIUM CHLORIDE 20 MG/1
20 TABLET, FILM COATED ORAL NIGHTLY
Status: DISCONTINUED | OUTPATIENT
Start: 2024-10-06 | End: 2024-10-11 | Stop reason: HOSPADM

## 2024-10-06 RX ORDER — MORPHINE SULFATE 2 MG/ML
2 INJECTION, SOLUTION INTRAMUSCULAR; INTRAVENOUS EVERY 4 HOURS PRN
Status: DISCONTINUED | OUTPATIENT
Start: 2024-10-06 | End: 2024-10-09

## 2024-10-06 RX ORDER — GLYCOPYRROLATE 1 MG/1
2 TABLET ORAL DAILY
Status: DISCONTINUED | OUTPATIENT
Start: 2024-10-06 | End: 2024-10-06

## 2024-10-06 RX ORDER — SODIUM CHLORIDE 0.9 % (FLUSH) 0.9 %
5-40 SYRINGE (ML) INJECTION EVERY 12 HOURS SCHEDULED
Status: DISCONTINUED | OUTPATIENT
Start: 2024-10-06 | End: 2024-10-11 | Stop reason: HOSPADM

## 2024-10-06 RX ORDER — METOPROLOL TARTRATE 25 MG/1
25 TABLET, FILM COATED ORAL 2 TIMES DAILY
Status: DISCONTINUED | OUTPATIENT
Start: 2024-10-06 | End: 2024-10-11 | Stop reason: HOSPADM

## 2024-10-06 RX ORDER — POLYETHYLENE GLYCOL 3350 17 G/17G
17 POWDER, FOR SOLUTION ORAL DAILY PRN
Status: DISCONTINUED | OUTPATIENT
Start: 2024-10-06 | End: 2024-10-11 | Stop reason: HOSPADM

## 2024-10-06 RX ADMIN — HYDROMORPHONE HYDROCHLORIDE 0.5 MG: 1 INJECTION, SOLUTION INTRAMUSCULAR; INTRAVENOUS; SUBCUTANEOUS at 04:01

## 2024-10-06 RX ADMIN — Medication 6 MG: at 20:51

## 2024-10-06 RX ADMIN — ACETAMINOPHEN 1000 MG: 500 TABLET ORAL at 14:39

## 2024-10-06 RX ADMIN — ACETAMINOPHEN 1000 MG: 500 TABLET ORAL at 21:46

## 2024-10-06 RX ADMIN — METOPROLOL TARTRATE 25 MG: 25 TABLET, FILM COATED ORAL at 08:22

## 2024-10-06 RX ADMIN — FAMOTIDINE 20 MG: 10 INJECTION, SOLUTION INTRAVENOUS at 05:46

## 2024-10-06 RX ADMIN — SODIUM CHLORIDE: 9 INJECTION, SOLUTION INTRAVENOUS at 08:06

## 2024-10-06 RX ADMIN — PROCHLORPERAZINE EDISYLATE 10 MG: 5 INJECTION INTRAMUSCULAR; INTRAVENOUS at 04:00

## 2024-10-06 RX ADMIN — KETOROLAC TROMETHAMINE 15 MG: 15 INJECTION, SOLUTION INTRAMUSCULAR; INTRAVENOUS at 12:43

## 2024-10-06 RX ADMIN — SODIUM CHLORIDE, PRESERVATIVE FREE 10 ML: 5 INJECTION INTRAVENOUS at 08:07

## 2024-10-06 RX ADMIN — KETOROLAC TROMETHAMINE 15 MG: 15 INJECTION, SOLUTION INTRAMUSCULAR; INTRAVENOUS at 20:58

## 2024-10-06 RX ADMIN — ONDANSETRON 4 MG: 2 INJECTION INTRAMUSCULAR; INTRAVENOUS at 20:58

## 2024-10-06 RX ADMIN — ONDANSETRON 4 MG: 2 INJECTION INTRAMUSCULAR; INTRAVENOUS at 02:54

## 2024-10-06 RX ADMIN — KETOROLAC TROMETHAMINE 15 MG: 15 INJECTION, SOLUTION INTRAMUSCULAR; INTRAVENOUS at 06:40

## 2024-10-06 RX ADMIN — TROSPIUM CHLORIDE 20 MG: 20 TABLET, FILM COATED ORAL at 20:51

## 2024-10-06 RX ADMIN — HYDROMORPHONE HYDROCHLORIDE 1 MG: 1 INJECTION, SOLUTION INTRAMUSCULAR; INTRAVENOUS; SUBCUTANEOUS at 02:26

## 2024-10-06 RX ADMIN — METOPROLOL TARTRATE 25 MG: 25 TABLET, FILM COATED ORAL at 20:52

## 2024-10-06 RX ADMIN — PROPOFOL 74 MG: 10 INJECTION, EMULSION INTRAVENOUS at 02:53

## 2024-10-06 RX ADMIN — SODIUM CHLORIDE, PRESERVATIVE FREE 10 ML: 5 INJECTION INTRAVENOUS at 20:54

## 2024-10-06 RX ADMIN — PROCHLORPERAZINE EDISYLATE 10 MG: 5 INJECTION INTRAMUSCULAR; INTRAVENOUS at 16:06

## 2024-10-06 RX ADMIN — MORPHINE SULFATE 2 MG: 2 INJECTION, SOLUTION INTRAMUSCULAR; INTRAVENOUS at 18:10

## 2024-10-06 RX ADMIN — BUSPIRONE HYDROCHLORIDE 10 MG: 10 TABLET ORAL at 20:51

## 2024-10-06 RX ADMIN — ONDANSETRON 4 MG: 2 INJECTION INTRAMUSCULAR; INTRAVENOUS at 14:02

## 2024-10-06 RX ADMIN — ONDANSETRON 4 MG: 2 INJECTION INTRAMUSCULAR; INTRAVENOUS at 02:25

## 2024-10-06 RX ADMIN — MORPHINE SULFATE 2 MG: 2 INJECTION, SOLUTION INTRAMUSCULAR; INTRAVENOUS at 22:46

## 2024-10-06 ASSESSMENT — PAIN DESCRIPTION - DESCRIPTORS
DESCRIPTORS: ACHING
DESCRIPTORS: ACHING;DISCOMFORT
DESCRIPTORS: ACHING
DESCRIPTORS: ACHING;DISCOMFORT
DESCRIPTORS: ACHING;DISCOMFORT
DESCRIPTORS: DISCOMFORT
DESCRIPTORS: PRESSURE;SHOOTING;ACHING
DESCRIPTORS: ACHING;DISCOMFORT
DESCRIPTORS: ACHING;DISCOMFORT
DESCRIPTORS: ACHING

## 2024-10-06 ASSESSMENT — PAIN SCALES - GENERAL
PAINLEVEL_OUTOF10: 7
PAINLEVEL_OUTOF10: 5
PAINLEVEL_OUTOF10: 0
PAINLEVEL_OUTOF10: 7
PAINLEVEL_OUTOF10: 0
PAINLEVEL_OUTOF10: 7
PAINLEVEL_OUTOF10: 0
PAINLEVEL_OUTOF10: 10
PAINLEVEL_OUTOF10: 7
PAINLEVEL_OUTOF10: 3
PAINLEVEL_OUTOF10: 4
PAINLEVEL_OUTOF10: 5
PAINLEVEL_OUTOF10: 7
PAINLEVEL_OUTOF10: 5
PAINLEVEL_OUTOF10: 8
PAINLEVEL_OUTOF10: 7
PAINLEVEL_OUTOF10: 4

## 2024-10-06 ASSESSMENT — PAIN - FUNCTIONAL ASSESSMENT
PAIN_FUNCTIONAL_ASSESSMENT: ACTIVITIES ARE NOT PREVENTED
PAIN_FUNCTIONAL_ASSESSMENT: PREVENTS OR INTERFERES SOME ACTIVE ACTIVITIES AND ADLS
PAIN_FUNCTIONAL_ASSESSMENT: ACTIVITIES ARE NOT PREVENTED
PAIN_FUNCTIONAL_ASSESSMENT: PREVENTS OR INTERFERES WITH ALL ACTIVE AND SOME PASSIVE ACTIVITIES
PAIN_FUNCTIONAL_ASSESSMENT: PREVENTS OR INTERFERES WITH ALL ACTIVE AND SOME PASSIVE ACTIVITIES
PAIN_FUNCTIONAL_ASSESSMENT: ACTIVITIES ARE NOT PREVENTED

## 2024-10-06 ASSESSMENT — LIFESTYLE VARIABLES
HOW MANY STANDARD DRINKS CONTAINING ALCOHOL DO YOU HAVE ON A TYPICAL DAY: 1 OR 2
HOW OFTEN DO YOU HAVE A DRINK CONTAINING ALCOHOL: 2-4 TIMES A MONTH

## 2024-10-06 ASSESSMENT — PAIN DESCRIPTION - LOCATION
LOCATION: FOOT
LOCATION: ANKLE
LOCATION: FOOT
LOCATION: ANKLE
LOCATION: FOOT
LOCATION: FOOT
LOCATION: ANKLE

## 2024-10-06 ASSESSMENT — PAIN DESCRIPTION - ORIENTATION
ORIENTATION: RIGHT

## 2024-10-06 ASSESSMENT — PAIN DESCRIPTION - PAIN TYPE: TYPE: ACUTE PAIN

## 2024-10-06 NOTE — PLAN OF CARE
Problem: Safety - Adult  Goal: Free from fall injury  10/6/2024 1948 by Santhosh Tirado RN  Outcome: Progressing  10/6/2024 1642 by Juan Whittington RN  Outcome: Progressing     Problem: Discharge Planning  Goal: Discharge to home or other facility with appropriate resources  10/6/2024 1949 by Santhosh Tirado RN  Outcome: Progressing  10/6/2024 1642 by Juan Whittington RN  Outcome: Progressing  Flowsheets (Taken 10/6/2024 0800)  Discharge to home or other facility with appropriate resources:   Identify barriers to discharge with patient and caregiver   Arrange for needed discharge resources and transportation as appropriate   Identify discharge learning needs (meds, wound care, etc)   Arrange for interpreters to assist at discharge as needed   Refer to discharge planning if patient needs post-hospital services based on physician order or complex needs related to functional status, cognitive ability or social support system     Problem: Pain  Goal: Verbalizes/displays adequate comfort level or baseline comfort level  10/6/2024 1948 by Santhosh Tirado RN  Outcome: Progressing  10/6/2024 1642 by Juan Whittington RN  Outcome: Progressing  Flowsheets (Taken 10/6/2024 0800)  Verbalizes/displays adequate comfort level or baseline comfort level:   Encourage patient to monitor pain and request assistance   Assess pain using appropriate pain scale   Administer analgesics based on type and severity of pain and evaluate response   Implement non-pharmacological measures as appropriate and evaluate response   Consider cultural and social influences on pain and pain management   Notify Licensed Independent Practitioner if interventions unsuccessful or patient reports new pain     Problem: Skin/Tissue Integrity  Goal: Absence of new skin breakdown  Description: 1.  Monitor for areas of redness and/or skin breakdown  2.  Assess vascular access sites hourly  3.  Every 4-6 hours minimum:  Change oxygen saturation probe site  4.  Every 4-6 hours:  If

## 2024-10-06 NOTE — ED PROVIDER NOTES
Attending Supervisory Note/Shared Visit   I have personally performed a face to face diagnostic evaluation on this patient. I have reviewed the mid-level’s findings and agree.  History and Exam by me shows a well-appearing female with an obviously deformed right ankle.  Patient reports that she had crouch down to try to wipe something off the ground and while doing so and felt a snap in her right ankle.  She denies significant injury or fall or head injury.  Denies numbness or weakness to the extremity.  Patient has an obvious deformity to the anterior and lateral malleolus with the patient's foot appearing to be rotated medially at the distal end.  Denies a history of previous injury.  Denies knee pain or hip pain.  She is not take medications for symptoms.  She report alcohol use this evening.     On exam patient does have a very deformed ankle as described above.  PT and DP pulses are intact.  Cap fill is less than 3 seconds in all digits.  There is some erythema and discoloration to the skin over the lateral aspect of the deformity but skin is intact does not appear to be an open wound.  Patient has no tenderness to the knee or hips.  She is awake and alert answer questions appropriately and cranials 2-12 are grossly intact.  Head is normocephalic atraumatic.     Patient seen initially with the advanced breast provider.  X-ray obtained showed a significantly displaced ankle with fracture of the lateral malleolus.  Patient consented to procedural sedation and reduction in the emergency department.  Orthopedics was consulted.  Patient was given propofol until appropriately sedated and ankle was reduced in 1 attempt.  Patient tolerated procedure well.  Patient does have mobility issues and admission was discussed with patient to which she is amenable.  Basic laboratory workup obtained does show hyponatremia.  Renal function within normal limits.  Ethanol slightly elevated.  CT of the ankle was obtained at the

## 2024-10-06 NOTE — PLAN OF CARE
Problem: Safety - Adult  Goal: Free from fall injury  Outcome: Progressing     Problem: Discharge Planning  Goal: Discharge to home or other facility with appropriate resources  Outcome: Progressing  Flowsheets (Taken 10/6/2024 0800)  Discharge to home or other facility with appropriate resources:   Identify barriers to discharge with patient and caregiver   Arrange for needed discharge resources and transportation as appropriate   Identify discharge learning needs (meds, wound care, etc)   Arrange for interpreters to assist at discharge as needed   Refer to discharge planning if patient needs post-hospital services based on physician order or complex needs related to functional status, cognitive ability or social support system     Problem: Pain  Goal: Verbalizes/displays adequate comfort level or baseline comfort level  Outcome: Progressing  Flowsheets (Taken 10/6/2024 0800)  Verbalizes/displays adequate comfort level or baseline comfort level:   Encourage patient to monitor pain and request assistance   Assess pain using appropriate pain scale   Administer analgesics based on type and severity of pain and evaluate response   Implement non-pharmacological measures as appropriate and evaluate response   Consider cultural and social influences on pain and pain management   Notify Licensed Independent Practitioner if interventions unsuccessful or patient reports new pain     Problem: Skin/Tissue Integrity  Goal: Absence of new skin breakdown  Description: 1.  Monitor for areas of redness and/or skin breakdown  2.  Assess vascular access sites hourly  3.  Every 4-6 hours minimum:  Change oxygen saturation probe site  4.  Every 4-6 hours:  If on nasal continuous positive airway pressure, respiratory therapy assess nares and determine need for appliance change or resting period.  Outcome: Progressing

## 2024-10-06 NOTE — PROGRESS NOTES
4 Eyes Skin Assessment     NAME:  Little Conrad  YOB: 1950  MEDICAL RECORD NUMBER:  [id number]    The patient is being assessed for  Admission    I agree that at least one RN has performed a thorough Head to Toe Skin Assessment on the patient. ALL assessment sites listed below have been assessed.      Areas assessed by both nurses:    Head, Face, Ears, Shoulders, Back, Chest, Arms, Elbows, Hands, Sacrum. Buttock, Coccyx, Ischium, Legs. Feet and Heels, and Under Medical Devices         Does the Patient have a Wound? No noted wound(s)       Frank Prevention initiated by RN: No  Wound Care Orders initiated by RN: No    Pressure Injury (Stage 3,4, Unstageable, DTI, NWPT, and Complex wounds) if present, place Wound referral order by RN under : No    New Ostomies, if present place, Ostomy referral order under : No     Nurse 1 eSignature: Electronically signed by Inge Hook RN on 10/6/24 at 7:41 AM EDT    **SHARE this note so that the co-signing nurse can place an eSignature**    Nurse 2 eSignature: Electronically signed by Tracy Garcia RN on 10/6/24 at 9:28 AM EDT

## 2024-10-06 NOTE — H&P
V2.0  History and Physical      Name:  Little Conrad /Age/Sex: 1950  (74 y.o. female)   MRN & CSN:  [id number] & [id number] Encounter Date/Time: 10/6/2024 5:08 AM EDT   Location:  [address] [place] [address] PCP: Fredis Smiley MD       Hospital Day: 1    Assessment and Plan:   Little Conrad is a 74 y.o. female with a pmh of hypertension, tardive dyskinesia, panic attack who presents with Hyponatremia.    Hospital Problems             Last Modified POA    * (Principal) Hyponatremia 10/6/2024 Yes    Hypoxia 10/6/2024 Yes    Closed right ankle fracture, initial encounter 10/6/2024 Yes    UTI 10/6/2024 Yes    Essential hypertension 10/6/2024 Yes       Plan:  Hold losartan, normal saline infusion and check serum sodium every 6 hours, urine osmolality, urine sodium and chloride, serum protein, nephrology consult  Check EKG, troponin, BNP, Pro-Brenton, D-dimer given hypoxia and tachycardia  Metoprolol 25 mg twice daily  Resume home regimen for chronic stable conditions with the above-mentioned modification    Disposition:   Current Living situation: Home  Expected Disposition: Home versus SNF  Estimated D/C: 3 days    Diet ADULT DIET; Regular   DVT Prophylaxis [x] Lovenox, []  Heparin, [] SCDs, [] Ambulation,  [] Eliquis, [] Xarelto, [] Coumadin   Code Status Full Code   Surrogate Decision Maker/ POA      Personally reviewed Lab Studies and Imaging     Discussed management of the case with ED provider who recommended admission.    EKG interpreted personally and results none done.    Imaging that was interpreted personally includes chest x-ray, right ankle and right knee x-rays and results negative for any acute cardiopulmonary process, reduction of distal fibula fracture and tibiotalar dislocation, negative for any acute fracture or dislocation of the right knee.    Drugs that require monitoring for toxicity include none and the method of monitoring was n/a.        History from:     Patient,     History of

## 2024-10-06 NOTE — PROGRESS NOTES
input(s): \"APTT\" in the last 72 hours.  ABG: No results for input(s): \"PHART\", \"DCT9ASK\", \"PO2ART\" in the last 72 hours.    Any consults during the shift? Yes: Consults received: : ortho, nephro    Any signed and held orders to be released?  No        4 Eyes Skin Assessment       The patient is being assessed for  Shift Handoff    I agree that at least one RN has performed a thorough Head to Toe Skin Assessment on the patient. ALL assessment sites listed below have been assessed.      Areas assessed by both nurses: Head, Face, Ears, Shoulders, Back, Chest, Arms, Elbows, Hands, Sacrum. Buttock, Coccyx, Ischium, Legs. Feet and Heels, and Under Medical Devices         Does the Patient have a Wound? No noted wound(s)    Wound Care Orders initiated by RN: No       Frank Prevention initiated by RN: No    Pressure Injury (Stage 3,4, Unstageable, DTI, NWPT, and Complex wounds) if present, place Wound referral order by RN under : No    New Ostomies, if present place, Ostomy referral order under : No     Nurse 1 eSignature: Electronically signed by Juan Whittington RN on 10/6/24 at 4:46 PM EDT    **SHARE this note so that the co-signing nurse can place an eSignature**    Nurse 2 eSignature: Electronically signed by Santhosh Tirado RN on 10/6/24 at 9:14 PM EDT

## 2024-10-06 NOTE — ED PROVIDER NOTES
[facility] ICU  EMERGENCY DEPARTMENT ENCOUNTER        Pt Name: Little Conrad  MRN: [id number]  Birthdate 1950  Date of evaluation: 10/6/2024  Provider: SCARLETT Ortega  PCP: Fredis Smiley MD  Note Started: 2:28 AM EDT 10/6/24       I have seen and evaluated this patient with my supervising physician Tenzin Orantes MD.      CHIEF COMPLAINT       Chief Complaint   Patient presents with    Ankle Injury     Pt was trying to clean up a drink that spilled and states she felt a \"snap\" in her right ankle. Pt has obvious deformity to right ankle in triage.        HISTORY OF PRESENT ILLNESS: 1 or more Elements     History from : Patient    Limitations to history : None    Little Conrad is a 74 y.o. female who presents via EMS after a fall. She tells me she is staying with her sister whose  recently passed away she was leaning down cleaning up some beer she had spilled on the floor when she stepped back and felt her ankle pop/crack. She tells me she didn't fall that she did not hit her head. Denies neck pain, hip pain or arm pain. She is not on any blood thinners. She complains of 10 out of 10 pain in the ankle. Received fentanyl route from EMS. She tells me she does drink beer regularly. Denies chest pain, shortness of breath or abdominal pain    Nursing Notes were all reviewed and agreed with or any disagreements were addressed in the HPI.    REVIEW OF SYSTEMS :      Review of Systems   Constitutional:  Negative for fever.   Respiratory:  Negative for shortness of breath.    Cardiovascular:  Negative for chest pain.   Musculoskeletal:  Positive for arthralgias, gait problem and joint swelling.   Skin:  Negative for color change.   Neurological:  Negative for weakness and numbness.   Psychiatric/Behavioral:  Negative for agitation, behavioral problems and confusion.        Positives and Pertinent negatives as per HPI.     SURGICAL HISTORY     Past Surgical History:   Procedure Laterality Date

## 2024-10-06 NOTE — ED TRIAGE NOTES
Patient arrived via [facility] from home for a right ankle injury. Pt stated \"I was trying to clean up a spilled drink and felt a snap in my right ankle\".  Pt denies falling. There is an obvious deformity to right ankle in triage, pulses are intact. EMS gave pt 100 mcg of fentanyl en rout and placed her on 4L NC due to O2 dropping. Pt baseline is room air. Pt A&Ox4. Dr. Orantes and Edda PANTOJA at the bedside at this time.

## 2024-10-06 NOTE — PROGRESS NOTES
Pt arrived to ICU via stretcher from ED Bedside report received from ED RN. Pt attached to ICU Bedside Monitor. Pt alert and oriented x4. Rhythm at this time is sinus tachycardia. Currently receiving no infusions. Bed alarm active and call light within reach. Educated pt on the use of call light and importance of calling RN before attempting to get out of bed. Pt resting comfortably at this time. Purwick applied, patient changed in gown, updated patients  about plan of care.    Electronically signed by Inge Hook RN on 10/6/2024 at 7:43 AM

## 2024-10-06 NOTE — PROGRESS NOTES
Pharmacy Medication Reconciliation Note     List of medications patient is currently taking is complete.    Source of information:   1. Discussion with patient at bedside  2. Epic records (dispense report)    Notes regarding home medications:   1. Medication list up to date  2. Added zolpidem and solifenacin.   3. Adjusted doses of gabapentin to 300 mg every other day.   4. Reports not taking glycopyrrolate and myrbetriq.     Miri Austin PharmD, BCPS  10/6/2024 11:03 AM     Albendazole Pregnancy And Lactation Text: This medication is Pregnancy Category C and it isn't known if it is safe during pregnancy. It is also excreted in breast milk.

## 2024-10-06 NOTE — ED NOTES
ED SBAR report provider to SUE Deras. Patient to be transported to Room 3103  via stretcher by RN.Patient transported with bedside cardiac monitor. IV site clean, dry, and intact. MEWS score and pain assessed as 5/10 and documented. Patient's score on the RANKIN Fall scale reviewed with receiving RN. Updated patient and family on plan of care.

## 2024-10-07 ENCOUNTER — APPOINTMENT (OUTPATIENT)
Dept: GENERAL RADIOLOGY | Age: 74
End: 2024-10-07
Payer: MEDICARE

## 2024-10-07 ENCOUNTER — ANESTHESIA (OUTPATIENT)
Dept: OPERATING ROOM | Age: 74
End: 2024-10-07
Payer: MEDICARE

## 2024-10-07 ENCOUNTER — ANESTHESIA EVENT (OUTPATIENT)
Dept: OPERATING ROOM | Age: 74
End: 2024-10-07
Payer: MEDICARE

## 2024-10-07 LAB
ALBUMIN SERPL ELPH-MCNC: 3.3 G/DL (ref 3.1–4.9)
ALPHA1 GLOB SERPL ELPH-MCNC: 0.2 G/DL (ref 0.2–0.4)
ALPHA2 GLOB SERPL ELPH-MCNC: 0.9 G/DL (ref 0.4–1.1)
ANION GAP SERPL CALCULATED.3IONS-SCNC: 9 MMOL/L (ref 3–16)
B-GLOBULIN SERPL ELPH-MCNC: 1.2 G/DL (ref 0.9–1.6)
BASOPHILS # BLD: 0 K/UL (ref 0–0.2)
BASOPHILS NFR BLD: 0.3 %
BUN SERPL-MCNC: 7 MG/DL (ref 7–20)
CALCIUM SERPL-MCNC: 9 MG/DL (ref 8.3–10.6)
CHLORIDE SERPL-SCNC: 91 MMOL/L (ref 99–110)
CHLORIDE UR-SCNC: 22 MMOL/L
CO2 SERPL-SCNC: 24 MMOL/L (ref 21–32)
CREAT SERPL-MCNC: <0.5 MG/DL (ref 0.6–1.2)
DEPRECATED RDW RBC AUTO: 13 % (ref 12.4–15.4)
EOSINOPHIL # BLD: 0 K/UL (ref 0–0.6)
EOSINOPHIL NFR BLD: 0.8 %
GAMMA GLOB SERPL ELPH-MCNC: 1.1 G/DL (ref 0.6–1.8)
GFR SERPLBLD CREATININE-BSD FMLA CKD-EPI: >90 ML/MIN/{1.73_M2}
GLUCOSE SERPL-MCNC: 104 MG/DL (ref 70–99)
HCT VFR BLD AUTO: 32.8 % (ref 36–48)
HGB BLD-MCNC: 11.5 G/DL (ref 12–16)
LYMPHOCYTES # BLD: 0.9 K/UL (ref 1–5.1)
LYMPHOCYTES NFR BLD: 15.6 %
MCH RBC QN AUTO: 34.6 PG (ref 26–34)
MCHC RBC AUTO-ENTMCNC: 35.1 G/DL (ref 31–36)
MCV RBC AUTO: 98.6 FL (ref 80–100)
MONOCYTES # BLD: 0.5 K/UL (ref 0–1.3)
MONOCYTES NFR BLD: 9.2 %
NEUTROPHILS # BLD: 4.2 K/UL (ref 1.7–7.7)
NEUTROPHILS NFR BLD: 74.1 %
PLATELET # BLD AUTO: 174 K/UL (ref 135–450)
PMV BLD AUTO: 7.6 FL (ref 5–10.5)
POTASSIUM SERPL-SCNC: 3.9 MMOL/L (ref 3.5–5.1)
PROT PATTERN UR ELPH-IMP: NORMAL
PROT SERPL-MCNC: 6.7 G/DL (ref 6.4–8.2)
RBC # BLD AUTO: 3.33 M/UL (ref 4–5.2)
SODIUM SERPL-SCNC: 124 MMOL/L (ref 136–145)
SODIUM SERPL-SCNC: 132 MMOL/L (ref 136–145)
SODIUM SERPL-SCNC: 134 MMOL/L (ref 136–145)
SODIUM UR-SCNC: <20 MMOL/L
SPE/IFE INTERPRETATION: NORMAL
WBC # BLD AUTO: 5.7 K/UL (ref 4–11)

## 2024-10-07 PROCEDURE — 2060000000 HC ICU INTERMEDIATE R&B

## 2024-10-07 PROCEDURE — 6370000000 HC RX 637 (ALT 250 FOR IP): Performed by: INTERNAL MEDICINE

## 2024-10-07 PROCEDURE — 99223 1ST HOSP IP/OBS HIGH 75: CPT | Performed by: ORTHOPAEDIC SURGERY

## 2024-10-07 PROCEDURE — 85025 COMPLETE CBC W/AUTO DIFF WBC: CPT

## 2024-10-07 PROCEDURE — 6360000002 HC RX W HCPCS

## 2024-10-07 PROCEDURE — 84295 ASSAY OF SERUM SODIUM: CPT

## 2024-10-07 PROCEDURE — 6360000002 HC RX W HCPCS: Performed by: ORTHOPAEDIC SURGERY

## 2024-10-07 PROCEDURE — 2500000003 HC RX 250 WO HCPCS

## 2024-10-07 PROCEDURE — 6370000000 HC RX 637 (ALT 250 FOR IP): Performed by: REGISTERED NURSE

## 2024-10-07 PROCEDURE — 0QSJ04Z REPOSITION RIGHT FIBULA WITH INTERNAL FIXATION DEVICE, OPEN APPROACH: ICD-10-PCS | Performed by: ORTHOPAEDIC SURGERY

## 2024-10-07 PROCEDURE — 3600000014 HC SURGERY LEVEL 4 ADDTL 15MIN: Performed by: ORTHOPAEDIC SURGERY

## 2024-10-07 PROCEDURE — 27848 TREAT ANKLE DISLOCATION: CPT | Performed by: NURSE PRACTITIONER

## 2024-10-07 PROCEDURE — 2709999900 HC NON-CHARGEABLE SUPPLY: Performed by: ORTHOPAEDIC SURGERY

## 2024-10-07 PROCEDURE — 27829 TREAT LOWER LEG JOINT: CPT | Performed by: ORTHOPAEDIC SURGERY

## 2024-10-07 PROCEDURE — 2580000003 HC RX 258: Performed by: ANESTHESIOLOGY

## 2024-10-07 PROCEDURE — C1713 ANCHOR/SCREW BN/BN,TIS/BN: HCPCS | Performed by: ORTHOPAEDIC SURGERY

## 2024-10-07 PROCEDURE — 2720000010 HC SURG SUPPLY STERILE: Performed by: ORTHOPAEDIC SURGERY

## 2024-10-07 PROCEDURE — 6370000000 HC RX 637 (ALT 250 FOR IP)

## 2024-10-07 PROCEDURE — 27848 TREAT ANKLE DISLOCATION: CPT | Performed by: ORTHOPAEDIC SURGERY

## 2024-10-07 PROCEDURE — 2580000003 HC RX 258: Performed by: ORTHOPAEDIC SURGERY

## 2024-10-07 PROCEDURE — 3600000004 HC SURGERY LEVEL 4 BASE: Performed by: ORTHOPAEDIC SURGERY

## 2024-10-07 PROCEDURE — 2580000003 HC RX 258: Performed by: INTERNAL MEDICINE

## 2024-10-07 PROCEDURE — 27829 TREAT LOWER LEG JOINT: CPT | Performed by: NURSE PRACTITIONER

## 2024-10-07 PROCEDURE — 0SSF04Z REPOSITION RIGHT ANKLE JOINT WITH INTERNAL FIXATION DEVICE, OPEN APPROACH: ICD-10-PCS | Performed by: ORTHOPAEDIC SURGERY

## 2024-10-07 PROCEDURE — 3700000000 HC ANESTHESIA ATTENDED CARE: Performed by: ORTHOPAEDIC SURGERY

## 2024-10-07 PROCEDURE — 73600 X-RAY EXAM OF ANKLE: CPT

## 2024-10-07 PROCEDURE — 6360000002 HC RX W HCPCS: Performed by: INTERNAL MEDICINE

## 2024-10-07 PROCEDURE — 80048 BASIC METABOLIC PNL TOTAL CA: CPT

## 2024-10-07 PROCEDURE — 3700000001 HC ADD 15 MINUTES (ANESTHESIA): Performed by: ORTHOPAEDIC SURGERY

## 2024-10-07 PROCEDURE — 36415 COLL VENOUS BLD VENIPUNCTURE: CPT

## 2024-10-07 PROCEDURE — 2580000003 HC RX 258

## 2024-10-07 DEVICE — K WIRE FIX L150MM DIA1.6MM TRCR PNT 1 END FOR SM FRAG UNIV: Type: IMPLANTABLE DEVICE | Site: ANKLE | Status: FUNCTIONAL

## 2024-10-07 DEVICE — SCREW BNE L14MM DIA3.5MM HD DIA2.7MM CORT PERIARTC S STL ST: Type: IMPLANTABLE DEVICE | Site: ANKLE | Status: FUNCTIONAL

## 2024-10-07 DEVICE — SCREW BNE L16MM DIA2.7MM HEX HD DIA2.5MM CANC BIODUR 108C: Type: IMPLANTABLE DEVICE | Site: ANKLE | Status: FUNCTIONAL

## 2024-10-07 DEVICE — PLATE BNE L80MM 4 H R LAT DST PERIARTC FIBULAR S STL LOK: Type: IMPLANTABLE DEVICE | Site: ANKLE | Status: FUNCTIONAL

## 2024-10-07 DEVICE — SCREW BNE L48MM DIA3.5MM CORT PERIARTC S STL ST: Type: IMPLANTABLE DEVICE | Site: ANKLE | Status: FUNCTIONAL

## 2024-10-07 DEVICE — SCREW BNE L18MM DIA2.7MM HEX HD DIA2.5MM CANC BIODUR 108C: Type: IMPLANTABLE DEVICE | Site: ANKLE | Status: FUNCTIONAL

## 2024-10-07 DEVICE — SCREW BNE L14MM DIA2.7MM HEX HD DIA2.5MM CANC BIODUR 108C: Type: IMPLANTABLE DEVICE | Site: ANKLE | Status: FUNCTIONAL

## 2024-10-07 DEVICE — SCREW BNE L44MM DIA3.5MM CORT S STL ST NONCANNULATED IM: Type: IMPLANTABLE DEVICE | Site: ANKLE | Status: FUNCTIONAL

## 2024-10-07 RX ORDER — BUPIVACAINE HYDROCHLORIDE 5 MG/ML
INJECTION, SOLUTION EPIDURAL; INTRACAUDAL
Status: COMPLETED | OUTPATIENT
Start: 2024-10-07 | End: 2024-10-07

## 2024-10-07 RX ORDER — OXYCODONE HYDROCHLORIDE 5 MG/1
5 TABLET ORAL PRN
Status: ACTIVE | OUTPATIENT
Start: 2024-10-07 | End: 2024-10-07

## 2024-10-07 RX ORDER — DEXTROSE MONOHYDRATE 50 MG/ML
INJECTION, SOLUTION INTRAVENOUS CONTINUOUS
Status: DISCONTINUED | OUTPATIENT
Start: 2024-10-07 | End: 2024-10-08

## 2024-10-07 RX ORDER — FENTANYL CITRATE 0.05 MG/ML
50 INJECTION, SOLUTION INTRAMUSCULAR; INTRAVENOUS EVERY 5 MIN PRN
Status: DISCONTINUED | OUTPATIENT
Start: 2024-10-07 | End: 2024-10-08 | Stop reason: HOSPADM

## 2024-10-07 RX ORDER — FENTANYL CITRATE 50 UG/ML
INJECTION, SOLUTION INTRAMUSCULAR; INTRAVENOUS
Status: DISCONTINUED | OUTPATIENT
Start: 2024-10-07 | End: 2024-10-07 | Stop reason: SDUPTHER

## 2024-10-07 RX ORDER — SODIUM CHLORIDE 0.9 % (FLUSH) 0.9 %
5-40 SYRINGE (ML) INJECTION EVERY 12 HOURS SCHEDULED
Status: DISCONTINUED | OUTPATIENT
Start: 2024-10-07 | End: 2024-10-11 | Stop reason: HOSPADM

## 2024-10-07 RX ORDER — OXYCODONE HYDROCHLORIDE 10 MG/1
10 TABLET ORAL PRN
Status: ACTIVE | OUTPATIENT
Start: 2024-10-07 | End: 2024-10-07

## 2024-10-07 RX ORDER — TOLVAPTAN 15 MG/1
15 TABLET ORAL ONCE
Status: COMPLETED | OUTPATIENT
Start: 2024-10-07 | End: 2024-10-07

## 2024-10-07 RX ORDER — SODIUM CHLORIDE 0.9 % (FLUSH) 0.9 %
5-40 SYRINGE (ML) INJECTION PRN
Status: DISCONTINUED | OUTPATIENT
Start: 2024-10-07 | End: 2024-10-11 | Stop reason: HOSPADM

## 2024-10-07 RX ORDER — LIDOCAINE HYDROCHLORIDE 20 MG/ML
INJECTION, SOLUTION EPIDURAL; INFILTRATION; INTRACAUDAL; PERINEURAL
Status: DISCONTINUED | OUTPATIENT
Start: 2024-10-07 | End: 2024-10-07 | Stop reason: SDUPTHER

## 2024-10-07 RX ORDER — ACETAMINOPHEN 325 MG/1
650 TABLET ORAL
Status: DISCONTINUED | OUTPATIENT
Start: 2024-10-07 | End: 2024-10-08 | Stop reason: HOSPADM

## 2024-10-07 RX ORDER — DEXAMETHASONE SODIUM PHOSPHATE 4 MG/ML
INJECTION, SOLUTION INTRA-ARTICULAR; INTRALESIONAL; INTRAMUSCULAR; INTRAVENOUS; SOFT TISSUE
Status: DISCONTINUED | OUTPATIENT
Start: 2024-10-07 | End: 2024-10-07 | Stop reason: SDUPTHER

## 2024-10-07 RX ORDER — SODIUM CHLORIDE, SODIUM LACTATE, POTASSIUM CHLORIDE, CALCIUM CHLORIDE 600; 310; 30; 20 MG/100ML; MG/100ML; MG/100ML; MG/100ML
INJECTION, SOLUTION INTRAVENOUS
Status: DISCONTINUED | OUTPATIENT
Start: 2024-10-07 | End: 2024-10-07 | Stop reason: SDUPTHER

## 2024-10-07 RX ORDER — CEFAZOLIN SODIUM 1 G/3ML
INJECTION, POWDER, FOR SOLUTION INTRAMUSCULAR; INTRAVENOUS
Status: DISCONTINUED | OUTPATIENT
Start: 2024-10-07 | End: 2024-10-07 | Stop reason: SDUPTHER

## 2024-10-07 RX ORDER — NALOXONE HYDROCHLORIDE 0.4 MG/ML
INJECTION, SOLUTION INTRAMUSCULAR; INTRAVENOUS; SUBCUTANEOUS PRN
Status: DISCONTINUED | OUTPATIENT
Start: 2024-10-07 | End: 2024-10-08 | Stop reason: HOSPADM

## 2024-10-07 RX ORDER — ONDANSETRON 2 MG/ML
4 INJECTION INTRAMUSCULAR; INTRAVENOUS
Status: DISCONTINUED | OUTPATIENT
Start: 2024-10-07 | End: 2024-10-08 | Stop reason: HOSPADM

## 2024-10-07 RX ORDER — PHENYLEPHRINE HCL IN 0.9% NACL 1 MG/10 ML
SYRINGE (ML) INTRAVENOUS
Status: DISCONTINUED | OUTPATIENT
Start: 2024-10-07 | End: 2024-10-07 | Stop reason: SDUPTHER

## 2024-10-07 RX ORDER — MIRTAZAPINE 15 MG/1
15 TABLET, ORALLY DISINTEGRATING ORAL ONCE
Status: COMPLETED | OUTPATIENT
Start: 2024-10-07 | End: 2024-10-07

## 2024-10-07 RX ORDER — ONDANSETRON 2 MG/ML
INJECTION INTRAMUSCULAR; INTRAVENOUS
Status: DISCONTINUED | OUTPATIENT
Start: 2024-10-07 | End: 2024-10-07 | Stop reason: SDUPTHER

## 2024-10-07 RX ORDER — ROCURONIUM BROMIDE 10 MG/ML
INJECTION, SOLUTION INTRAVENOUS
Status: DISCONTINUED | OUTPATIENT
Start: 2024-10-07 | End: 2024-10-07 | Stop reason: SDUPTHER

## 2024-10-07 RX ORDER — PROPOFOL 10 MG/ML
INJECTION, EMULSION INTRAVENOUS
Status: DISCONTINUED | OUTPATIENT
Start: 2024-10-07 | End: 2024-10-07 | Stop reason: SDUPTHER

## 2024-10-07 RX ORDER — SODIUM CHLORIDE 9 MG/ML
INJECTION, SOLUTION INTRAVENOUS PRN
Status: DISCONTINUED | OUTPATIENT
Start: 2024-10-07 | End: 2024-10-11 | Stop reason: HOSPADM

## 2024-10-07 RX ORDER — SUCCINYLCHOLINE/SOD CL,ISO/PF 200MG/10ML
SYRINGE (ML) INTRAVENOUS
Status: DISCONTINUED | OUTPATIENT
Start: 2024-10-07 | End: 2024-10-07 | Stop reason: SDUPTHER

## 2024-10-07 RX ADMIN — SODIUM CHLORIDE, PRESERVATIVE FREE 10 ML: 5 INJECTION INTRAVENOUS at 19:47

## 2024-10-07 RX ADMIN — METOPROLOL TARTRATE 25 MG: 25 TABLET, FILM COATED ORAL at 19:43

## 2024-10-07 RX ADMIN — SODIUM CHLORIDE, SODIUM LACTATE, POTASSIUM CHLORIDE, AND CALCIUM CHLORIDE: .6; .31; .03; .02 INJECTION, SOLUTION INTRAVENOUS at 13:19

## 2024-10-07 RX ADMIN — LIDOCAINE HYDROCHLORIDE 100 MG: 20 INJECTION, SOLUTION EPIDURAL; INFILTRATION; INTRACAUDAL; PERINEURAL at 13:25

## 2024-10-07 RX ADMIN — MIRTAZAPINE 15 MG: 15 TABLET, ORALLY DISINTEGRATING ORAL at 00:24

## 2024-10-07 RX ADMIN — ZOLPIDEM TARTRATE 10 MG: 5 TABLET, COATED ORAL at 23:25

## 2024-10-07 RX ADMIN — MORPHINE SULFATE 2 MG: 2 INJECTION, SOLUTION INTRAMUSCULAR; INTRAVENOUS at 23:25

## 2024-10-07 RX ADMIN — KETOROLAC TROMETHAMINE 15 MG: 15 INJECTION, SOLUTION INTRAMUSCULAR; INTRAVENOUS at 18:10

## 2024-10-07 RX ADMIN — SUGAMMADEX 200 MG: 100 INJECTION, SOLUTION INTRAVENOUS at 14:14

## 2024-10-07 RX ADMIN — SODIUM CHLORIDE, PRESERVATIVE FREE 10 ML: 5 INJECTION INTRAVENOUS at 19:44

## 2024-10-07 RX ADMIN — Medication 120 MG: at 13:25

## 2024-10-07 RX ADMIN — GABAPENTIN 300 MG: 300 CAPSULE ORAL at 08:28

## 2024-10-07 RX ADMIN — PROPOFOL 120 MG: 10 INJECTION, EMULSION INTRAVENOUS at 13:25

## 2024-10-07 RX ADMIN — FENTANYL CITRATE 25 MCG: 50 INJECTION INTRAMUSCULAR; INTRAVENOUS at 14:16

## 2024-10-07 RX ADMIN — MORPHINE SULFATE 2 MG: 2 INJECTION, SOLUTION INTRAMUSCULAR; INTRAVENOUS at 15:12

## 2024-10-07 RX ADMIN — MORPHINE SULFATE 2 MG: 2 INJECTION, SOLUTION INTRAMUSCULAR; INTRAVENOUS at 08:56

## 2024-10-07 RX ADMIN — CEFAZOLIN 2 G: 1 INJECTION, POWDER, FOR SOLUTION INTRAMUSCULAR; INTRAVENOUS at 13:37

## 2024-10-07 RX ADMIN — TROSPIUM CHLORIDE 20 MG: 20 TABLET, FILM COATED ORAL at 19:43

## 2024-10-07 RX ADMIN — DEXAMETHASONE SODIUM PHOSPHATE 4 MG: 4 INJECTION, SOLUTION INTRAMUSCULAR; INTRAVENOUS at 13:40

## 2024-10-07 RX ADMIN — FENTANYL CITRATE 50 MCG: 50 INJECTION INTRAMUSCULAR; INTRAVENOUS at 13:22

## 2024-10-07 RX ADMIN — ONDANSETRON 4 MG: 2 INJECTION INTRAMUSCULAR; INTRAVENOUS at 13:40

## 2024-10-07 RX ADMIN — BUSPIRONE HYDROCHLORIDE 10 MG: 10 TABLET ORAL at 19:42

## 2024-10-07 RX ADMIN — Medication 100 MCG: at 13:37

## 2024-10-07 RX ADMIN — ACETAMINOPHEN 1000 MG: 500 TABLET ORAL at 15:12

## 2024-10-07 RX ADMIN — METOPROLOL TARTRATE 25 MG: 25 TABLET, FILM COATED ORAL at 08:28

## 2024-10-07 RX ADMIN — TOLVAPTAN 15 MG: 15 TABLET ORAL at 08:28

## 2024-10-07 RX ADMIN — KETOROLAC TROMETHAMINE 15 MG: 15 INJECTION, SOLUTION INTRAMUSCULAR; INTRAVENOUS at 05:50

## 2024-10-07 RX ADMIN — ROCURONIUM BROMIDE 5 MG: 10 SOLUTION INTRAVENOUS at 13:25

## 2024-10-07 RX ADMIN — ROCURONIUM BROMIDE 25 MG: 10 SOLUTION INTRAVENOUS at 13:30

## 2024-10-07 RX ADMIN — ACETAMINOPHEN 1000 MG: 500 TABLET ORAL at 05:51

## 2024-10-07 RX ADMIN — DEXTROSE MONOHYDRATE: 50 INJECTION, SOLUTION INTRAVENOUS at 16:28

## 2024-10-07 RX ADMIN — BUSPIRONE HYDROCHLORIDE 10 MG: 10 TABLET ORAL at 08:28

## 2024-10-07 RX ADMIN — PANTOPRAZOLE SODIUM 40 MG: 40 TABLET, DELAYED RELEASE ORAL at 05:51

## 2024-10-07 RX ADMIN — FENTANYL CITRATE 25 MCG: 50 INJECTION INTRAMUSCULAR; INTRAVENOUS at 14:09

## 2024-10-07 RX ADMIN — ACETAMINOPHEN 1000 MG: 500 TABLET ORAL at 20:27

## 2024-10-07 ASSESSMENT — PAIN SCALES - GENERAL
PAINLEVEL_OUTOF10: 7
PAINLEVEL_OUTOF10: 8
PAINLEVEL_OUTOF10: 5
PAINLEVEL_OUTOF10: 7
PAINLEVEL_OUTOF10: 8
PAINLEVEL_OUTOF10: 5
PAINLEVEL_OUTOF10: 8
PAINLEVEL_OUTOF10: 5
PAINLEVEL_OUTOF10: 8
PAINLEVEL_OUTOF10: 5
PAINLEVEL_OUTOF10: 8

## 2024-10-07 ASSESSMENT — PAIN - FUNCTIONAL ASSESSMENT
PAIN_FUNCTIONAL_ASSESSMENT: PREVENTS OR INTERFERES SOME ACTIVE ACTIVITIES AND ADLS
PAIN_FUNCTIONAL_ASSESSMENT: PREVENTS OR INTERFERES SOME ACTIVE ACTIVITIES AND ADLS
PAIN_FUNCTIONAL_ASSESSMENT: PREVENTS OR INTERFERES WITH ALL ACTIVE AND SOME PASSIVE ACTIVITIES
PAIN_FUNCTIONAL_ASSESSMENT: PREVENTS OR INTERFERES SOME ACTIVE ACTIVITIES AND ADLS
PAIN_FUNCTIONAL_ASSESSMENT: ACTIVITIES ARE NOT PREVENTED
PAIN_FUNCTIONAL_ASSESSMENT: PREVENTS OR INTERFERES SOME ACTIVE ACTIVITIES AND ADLS

## 2024-10-07 ASSESSMENT — LIFESTYLE VARIABLES: SMOKING_STATUS: 0

## 2024-10-07 ASSESSMENT — PAIN DESCRIPTION - FREQUENCY
FREQUENCY: CONTINUOUS

## 2024-10-07 ASSESSMENT — PAIN DESCRIPTION - ORIENTATION
ORIENTATION: RIGHT
ORIENTATION: MID
ORIENTATION: RIGHT

## 2024-10-07 ASSESSMENT — PAIN DESCRIPTION - DESCRIPTORS
DESCRIPTORS: ACHING
DESCRIPTORS: ACHING
DESCRIPTORS: ACHING;DISCOMFORT
DESCRIPTORS: ACHING
DESCRIPTORS: ACHING
DESCRIPTORS: ACHING;DISCOMFORT
DESCRIPTORS: SHARP
DESCRIPTORS: ACHING;DISCOMFORT
DESCRIPTORS: ACHING;DISCOMFORT
DESCRIPTORS: ACHING

## 2024-10-07 ASSESSMENT — PAIN DESCRIPTION - ONSET
ONSET: ON-GOING

## 2024-10-07 ASSESSMENT — PAIN DESCRIPTION - PAIN TYPE
TYPE: ACUTE PAIN

## 2024-10-07 ASSESSMENT — PAIN DESCRIPTION - LOCATION
LOCATION: ANKLE
LOCATION: ANKLE
LOCATION: ANKLE;KNEE
LOCATION: LEG
LOCATION: HEAD
LOCATION: INCISION;LEG
LOCATION: ANKLE;RECTUM
LOCATION: LEG
LOCATION: ANKLE
LOCATION: LEG

## 2024-10-07 NOTE — PROGRESS NOTES
Dr Matta with nephro updated on pt's most recent sodium level. Order received to start D5 at 100ml/hr, continue checking sodium q6h with the next one starting at 1700. Dr Christianson updated on pt's status, okay to downgrade pt to PCU and order regular diet.

## 2024-10-07 NOTE — PROGRESS NOTES
Name:  Little Conrad /Age/Sex: 1950  (74 y.o. female)   MRN & CSN:  [id number] & [id number] Encounter Date/Time: 10/7/2024 1:09 PM EDT   Location:  [facility]-6024 PCP: Fredis Smiley MD     Attending:Brandee Christianson MD       Little Conrad is a 74 y.o. female with  has a past medical history of Acid reflux, Depression, GERD (gastroesophageal reflux disease), Hypertension, IBS (irritable bowel syndrome), Insomnia, Panic attacks, and Tardive dyskinesia. who presents with Hyponatremia    Hospital Day: 2      Interval history:     Seen and examined at bedside. Reports awaiting procedure this morning    Review of Systems:      10 point ROS negative except stated above    Objective:     Intake/Output Summary (Last 24 hours) at 10/7/2024 1309  Last data filed at 10/7/2024 1221  Gross per 24 hour   Intake 764.47 ml   Output 4015 ml   Net -3250.53 ml      Vitals:   Vitals:    10/07/24 0926 10/07/24 1000 10/07/24 1100 10/07/24 1200   BP:  (!) 145/70 (!) 146/79 (!) 156/83   Pulse: 84 83 82 83   Resp: 10 11 20 15   Temp:    98.2 °F (36.8 °C)   TempSrc:    Oral   SpO2: 91% 93% 93% 93%   Weight:       Height:             Physical Exam:        General: NAD  Eyes: EOMI  ENT: neck supple  Cardiovascular: Regular rate.  Respiratory: Clear to auscultation  Gastrointestinal: Soft, non tender  Genitourinary: no suprapubic tenderness  Musculoskeletal: Lower extremities wrapped  Neuro: Alert.  Psych: Mood appropriate.       Medications:   Medications:    sodium chloride flush  5-40 mL IntraVENous 2 times per day    [Held by provider] enoxaparin  40 mg SubCUTAneous Daily    busPIRone  10 mg Oral BID    pantoprazole  40 mg Oral QAM AC    Valbenazine Tosylate  40 mg Oral Daily    metoprolol tartrate  25 mg Oral BID    acetaminophen  1,000 mg Oral 3 times per day    gabapentin  300 mg Oral Every Other Day    trospium  20 mg Oral Nightly      Infusions:    sodium chloride       PRN Meds: prochlorperazine, 10 mg, Q6H  characterize the soft tissue injury as appropriate.       CBC:   Recent Labs     10/06/24  0213 10/07/24  0405   WBC 6.1 5.7   HGB 12.3 11.5*    174     BMP:    Recent Labs     10/06/24  0706 10/06/24  1058 10/06/24  1635 10/06/24  2222 10/07/24  0405 10/07/24  1047   * 123*   < > 120* 124* 132*   K 4.4 4.7  --   --  3.9  --    CL 87* 89*  --   --  91*  --    CO2 20* 23  --   --  24  --    BUN 9 12  --   --  7  --    CREATININE 0.6 0.6  --   --  <0.5*  --    GLUCOSE 131* 122*  --   --  104*  --     < > = values in this interval not displayed.     Hepatic:   Recent Labs     10/06/24  0213   AST 27   ALT 18   BILITOT <0.2   ALKPHOS 74     Lipids: No results found for: \"CHOL\", \"HDL\", \"TRIG\"  Hemoglobin A1C:   Lab Results   Component Value Date/Time    LABA1C 5.5 10/06/2024 07:06 AM     TSH:   Lab Results   Component Value Date/Time    TSH 2.59 10/06/2024 07:06 AM     Troponin: No results found for: \"TROPONINT\"  Lactic Acid: No results for input(s): \"LACTA\" in the last 72 hours.  BNP:   Recent Labs     10/06/24  0706   PROBNP 515*     UA:  Lab Results   Component Value Date/Time    NITRU Negative 10/06/2024 04:00 PM    COLORU Yellow 10/06/2024 04:00 PM    PHUR 5.5 10/06/2024 04:00 PM    WBCUA 5 10/06/2024 04:00 PM    RBCUA 2 10/06/2024 04:00 PM    BACTERIA 1+ 10/06/2024 04:00 PM    CLARITYU Clear 10/06/2024 04:00 PM    LEUKOCYTESUR TRACE 10/06/2024 04:00 PM    UROBILINOGEN 1.0 10/06/2024 04:00 PM    BILIRUBINUR Negative 10/06/2024 04:00 PM    BLOODU Negative 10/06/2024 04:00 PM    GLUCOSEU Negative 10/06/2024 04:00 PM    KETUA Negative 10/06/2024 04:00 PM     Urine Cultures: No results found for: \"LABURIN\"  Blood Cultures: No results found for: \"BC\"  No results found for: \"BLOODCULT2\"  Organism: No results found for: \"ORG\"      Assessment and Plan     Hyponatremia : Improving currently. U osm 627. Received 1 time dose of samasca. Monitor BMP to prevent rapid correction. Nephrology following. AM cortisol

## 2024-10-07 NOTE — PROGRESS NOTES
Pt returned from OR. Pt drowsy but awakens to voice. Denies pain. Pt's VSS on 2LNC. Pt's  remains at bedside/

## 2024-10-07 NOTE — ANESTHESIA POSTPROCEDURE EVALUATION
Department of Anesthesiology  Postprocedure Note    Patient: Little Conrad  MRN: [id number]  YOB: 1950  Date of evaluation: 10/7/2024    Procedure Summary       Date: 10/07/24 Room / Location: [address]    Anesthesia Start: 1319 Anesthesia Stop: 1425    Procedure: OPEN REDUCTION INTERNAL FIXATION RIGHT ANKLE FRACTURE DISLOCATION (Right: Ankle) Diagnosis:       Closed fracture of right ankle, initial encounter      (Closed fracture of right ankle, initial encounter [S82.891A])    Surgeons: Delisa Landis MD Responsible Provider: Ju Elam MD    Anesthesia Type: General ASA Status: 3            Anesthesia Type: General    Apolonia Phase I: Apolonia Score: 9    Apolonia Phase II:      Anesthesia Post Evaluation    Patient location during evaluation: PACU  Patient participation: complete - patient participated  Level of consciousness: awake and alert  Airway patency: patent  Nausea & Vomiting: no nausea and no vomiting  Cardiovascular status: hemodynamically stable  Respiratory status: acceptable  Hydration status: stable  Pain management: adequate    No notable events documented.

## 2024-10-07 NOTE — PROGRESS NOTES
NAME:  Little Conrad  YOB: 1950  MEDICAL RECORD NUMBER:  [id number]    Shift Summary: Pt to OR for ORIF right ankle. Sodium level increased 124 to 132. D5 started at 100ml/hr. Pt pleasant throughout shift, VSS.    Family updated: Yes:   at bedside    Rhythm: Normal Sinus Rhythm     Most recent vitals:   Visit Vitals  BP (!) 170/87   Pulse (!) 106   Temp 97.8 °F (36.6 °C) (Oral)   Resp 19   Ht 1.549 m (5' 1\")   Wt 73.9 kg (162 lb 14.7 oz)   SpO2 98%   BMI 30.78 kg/m²           No data found.    No data found.      Respiratory support needed (if any):  - RA    Admission weight Weight - Scale: 74 kg (163 lb 2.3 oz) (10/06/24 0200)    Today's weight    Wt Readings from Last 1 Encounters:   10/07/24 73.9 kg (162 lb 14.7 oz)        Rollins need assessed each shift: yes  UOP >30ml/hr: YES  Last documented BM (in last 48 hrs):  No data found.             Restraints (in use currently or dc'd in last 12 hrs): No    Order current and documentation up to date? No    Lines/Drains reviewed @ bedside.  Peripheral IV 10/06/24 Left Wrist (Active)   Number of days: 1       Peripheral IV 10/06/24 Right Forearm (Active)   Number of days: 1       Urinary Catheter 10/06/24 Rollins (Active)   Number of days: 1         Drip rates at handoff:    sodium chloride      dextrose 100 mL/hr at 10/07/24 1819    sodium chloride         Lab Data:   CBC:   Recent Labs     10/06/24  0213 10/07/24  0405   WBC 6.1 5.7   HGB 12.3 11.5*   HCT 35.9* 32.8*   MCV 99.2 98.6    174     BMP:    Recent Labs     10/06/24  1058 10/06/24  1635 10/07/24  0405 10/07/24  1047 10/07/24  1655   *   < > 124* 132* 134*   K 4.7  --  3.9  --   --    CO2 23  --  24  --   --    BUN 12  --  7  --   --    CREATININE 0.6  --  <0.5*  --   --     < > = values in this interval not displayed.     LIVR:   Recent Labs     10/06/24  0213   AST 27   ALT 18     PT/INR: No results for input(s): \"INR\" in the last 72 hours.    Invalid input(s):  \"PROT\"  APTT: No results for input(s): \"APTT\" in the last 72 hours.  ABG: No results for input(s): \"PHART\", \"UOE2YBU\", \"PO2ART\" in the last 72 hours.    Any consults during the shift? No    Any signed and held orders to be released?  No        4 Eyes Skin Assessment       The patient is being assessed for  Shift Handoff    I agree that at least one RN has performed a thorough Head to Toe Skin Assessment on the patient. ALL assessment sites listed below have been assessed.      Areas assessed by both nurses: Head, Face, Ears, Shoulders, Back, Chest, Arms, Elbows, Hands, Sacrum. Buttock, Coccyx, Ischium, and Legs. Feet and Heels        Does the Patient have a Wound? No noted wound(s)    Wound Care Orders initiated by RN: No       Frank Prevention initiated by RN: Yes    Pressure Injury (Stage 3,4, Unstageable, DTI, NWPT, and Complex wounds) if present, place Wound referral order by RN under : No    New Ostomies, if present place, Ostomy referral order under : No     Nurse 1 eSignature: Electronically signed by Priyanka Saini RN on 10/7/24 at 6:44 PM EDT    **SHARE this note so that the co-signing nurse can place an eSignature**    Nurse 2 eSignature: Electronically signed by Milvia Birmingham RN on 10/7/24 at 7:27 PM EDT

## 2024-10-07 NOTE — PLAN OF CARE
Orthopedic plan of care    Patient seen.   Chart and imaging reviewed.     Patient sustained right ankle fracture dislocation on 10/6/24. This was reduced and splinted. Patient will require surgical fixation for complete healing, earlier weight bearing, and ankle stability.     Patient discussed with Dr. Landis. Likely OR today (10/7/24)  Patient NPO.   Hold Lovenox.     Full consult and plan to follow    SCARLETT Morrell

## 2024-10-07 NOTE — CONSULTS
[facility]          [address] [place] [address]                              CONSULTATION      PATIENT NAME: FREYA BERNARD           : 1950  MED REC NO: [id number]                      ROOM: [place] [address]  ACCOUNT NO: [id number]                       ADMIT DATE: 10/06/2024  PROVIDER: Silvano Saez MD      CONSULT DATE: 10/06/2024    REASON FOR CONSULTATION:  Hyponatremia.    HISTORY OF PRESENTING ILLNESS:  A 74-year-old female with past medical history significant for hypertension, depression/anxiety, pain, tardive dyskinesia, came to ER after having a fall.  The patient was diagnosed with right ankle fracture.  The patient came to ER after she heard a crack and sudden pain in her right ankle.  X-rays confirmed right ankle fracture.  Routine labs showed hyponatremia, and the patient was admitted for further workup and management.  At the time of presentation, found to have a blood pressure in 190s.  Renal consultation has been called for hyponatremia.    At the time of consultation, the patient is feeling weak, tired, decreased level of energy.  Denies any chest pain, shortness of breath, or dyspnea on exertion.  Admits to poor appetite, but denies any weight loss.    MEDICATIONS:  Include Avapro, melatonin, Protonix, vitamin supplement, Zofran, BuSpar, Neurontin, fish oil.    ALLERGIES:  MEPERIDINE AND TRAMADOL.    SOCIAL HISTORY:  .  Former smoker.  No alcohol abuse.    FAMILY HISTORY:  Significant for hypertension.    REVIEW OF SYSTEMS:  Denies any headache.    PAST MEDICAL HISTORY:  Significant for,  1. Depression.  2. GERD.  3. Hypertension.  4. Irritable bowel syndrome.  5. Panic attack.  6. Tardive dyskinesia.  7. Hypercholesterolemia?  8. Insomnia.    PAST SURGICAL HISTORY:    1. Status post colonoscopy.  2. Status post hysterectomy.  3. Status post back surgery.  4. Status post shoulder surgery.  5. Status post foot surgery.    REVIEW OF

## 2024-10-07 NOTE — PROGRESS NOTES
Occupational Therapy    OT referral received and chart reviewed. Pt to OR today for ORIF R Ankle Fx. Please reconsult OT post-op when appropriate.     Electronically signed by Sindi Swartz OT on 10/7/2024 at 7:12 AM

## 2024-10-07 NOTE — ANESTHESIA PRE PROCEDURE
comment: IBS.   Endo/Other: Negative Endo/Other ROS   (+) blood dyscrasia: anemia:..                  ROS comment: Hyponatremia treated by primary team and nephrology.  Na 132 today. Abdominal:             Vascular: negative vascular ROS.         Other Findings:         Anesthesia Plan      general     ASA 3       Induction: intravenous.    MIPS: Postoperative opioids intended, Prophylactic antiemetics administered and Postoperative trial extubation.  Anesthetic plan and risks discussed with patient and spouse.      Plan discussed with CRNA.    Attending anesthesiologist reviewed and agrees with Preprocedure content      This pre-anesthesia assessment may be used as a history and physical.    DOS STAFF ADDENDUM:    Pt seen and examined, chart reviewed (including anesthesia, drug and allergy history).  No interval changes to history and physical examination.  Anesthetic plan, risks, benefits, alternatives, and personnel involved discussed with patient.  Patient verbalized an understanding and agrees to proceed.      Cuca Damian MD  October 7, 2024  11:52 AM

## 2024-10-07 NOTE — PROGRESS NOTES
NAME:  Little Conrad  YOB: 1950  MEDICAL RECORD NUMBER:  [id number]    Shift Summary: Pt NPO after midnight for sx in am, Sched TYL & PRN morphine/toradol for pain mgmt     Family updated: No    Rhythm: Normal Sinus Rhythm     Most recent vitals:   Visit Vitals  BP (!) 153/82   Pulse 96   Temp 98.2 °F (36.8 °C) (Oral)   Resp 20   Ht 1.549 m (5' 1\")   Wt 74 kg (163 lb 2.3 oz)   SpO2 93%   BMI 30.83 kg/m²           No data found.    No data found.      Respiratory support needed (if any):  - RA    Admission weight Weight - Scale: 74 kg (163 lb 2.3 oz) (10/06/24 0200)    Today's weight    Wt Readings from Last 1 Encounters:   10/06/24 74 kg (163 lb 2.3 oz)        Bassett need assessed each shift: YES -  - continue bassett r/t - retention  UOP >30ml/hr: YES  Last documented BM (in last 48 hrs):  No data found.             Restraints (in use currently or dc'd in last 12 hrs): No    Order current and documentation up to date? Yes    Lines/Drains reviewed @ bedside.  Peripheral IV 10/06/24 Left Wrist (Active)   Number of days: 0       Peripheral IV 10/06/24 Right Forearm (Active)   Number of days: 0       Urinary Catheter 10/06/24 Bassett (Active)   Number of days: 0         Drip rates at handoff:    sodium chloride         Lab Data:   CBC:   Recent Labs     10/06/24  0213   WBC 6.1   HGB 12.3   HCT 35.9*   MCV 99.2        BMP:    Recent Labs     10/06/24  0706 10/06/24  1058 10/06/24  1635 10/06/24  2222   * 123* 121* 120*   K 4.4 4.7  --   --    CO2 20* 23  --   --    BUN 9 12  --   --    CREATININE 0.6 0.6  --   --      LIVR:   Recent Labs     10/06/24  0213   AST 27   ALT 18     PT/INR: No results for input(s): \"INR\" in the last 72 hours.    Invalid input(s): \"PROT\"  APTT: No results for input(s): \"APTT\" in the last 72 hours.  ABG: No results for input(s): \"PHART\", \"[id number]\", \"PO2ART\" in the last 72 hours.    Any consults during the shift? No    Any signed and held orders to be released?

## 2024-10-07 NOTE — PROGRESS NOTES
Pt's consent form for surgery signed and placed in pt's chart. Pre-op checklist complete. Pt's gown and linens changed. Chlorhexidine bath done. PIVs patent.

## 2024-10-07 NOTE — PROGRESS NOTES
Physical Therapy  PT referral received and chart reviewed.  Pt to OR today for ORIF R Ankle Fx.  Please reconsult PT post-op when appropriate.  Thank you, Marlin Manzanares, PT

## 2024-10-07 NOTE — PLAN OF CARE
Problem: Safety - Adult  Goal: Free from fall injury  Outcome: Progressing  Flowsheets (Taken 10/7/2024 1851)  Free From Fall Injury: Instruct family/caregiver on patient safety     Problem: Discharge Planning  Goal: Discharge to home or other facility with appropriate resources  Outcome: Progressing  Flowsheets (Taken 10/7/2024 1851)  Discharge to home or other facility with appropriate resources:   Identify discharge learning needs (meds, wound care, etc)   Identify barriers to discharge with patient and caregiver     Problem: Pain  Goal: Verbalizes/displays adequate comfort level or baseline comfort level  Outcome: Progressing  Flowsheets (Taken 10/7/2024 1851)  Verbalizes/displays adequate comfort level or baseline comfort level:   Encourage patient to monitor pain and request assistance   Assess pain using appropriate pain scale   Administer analgesics based on type and severity of pain and evaluate response     Problem: Skin/Tissue Integrity  Goal: Absence of new skin breakdown  Description: 1.  Monitor for areas of redness and/or skin breakdown  2.  Assess vascular access sites hourly  3.  Every 4-6 hours minimum:  Change oxygen saturation probe site  4.  Every 4-6 hours:  If on nasal continuous positive airway pressure, respiratory therapy assess nares and determine need for appliance change or resting period.  Outcome: Progressing

## 2024-10-07 NOTE — PROGRESS NOTES
Nephrology Progress Note   [facility]      Reason for Consultation: Hyponatremia    History of Present Illness: Ms Conrad is a 74 female with past medical history significant for hypertension, depression/anxiety, pain, tardive dyskinesia, came to ER after having a fall. The patient was diagnosed with right ankle fracture. Nephrology consulted for Hyponatremia    Subjective:    In bed; NAD    Scheduled Meds:   sodium chloride flush  5-40 mL IntraVENous 2 times per day    [Held by provider] enoxaparin  40 mg SubCUTAneous Daily    busPIRone  10 mg Oral BID    pantoprazole  40 mg Oral QAM AC    Valbenazine Tosylate  40 mg Oral Daily    metoprolol tartrate  25 mg Oral BID    acetaminophen  1,000 mg Oral 3 times per day    gabapentin  300 mg Oral Every Other Day    trospium  20 mg Oral Nightly        sodium chloride         PRN Meds:.prochlorperazine, sodium chloride flush, sodium chloride, potassium chloride **OR** potassium alternative oral replacement **OR** potassium chloride, magnesium sulfate, ondansetron **OR** ondansetron, polyethylene glycol, acetaminophen **OR** acetaminophen, melatonin, ketorolac, zolpidem, morphine    Physical Exam:    TEMPERATURE:  Current - Temp: 97.6 °F (36.4 °C); Max - Temp  Av.1 °F (36.7 °C)  Min: 97.6 °F (36.4 °C)  Max: 98.6 °F (37 °C)  RESPIRATIONS RANGE: Resp  Av.2  Min: 8  Max: 21  PULSE RANGE: Pulse  Av.7  Min: 83  Max: 114  BLOOD PRESSURE RANGE:  Systolic (24hrs), Av , Min:77 , Max:182   ; Diastolic (24hrs), Av, Min:47, Max:96    24HR INTAKE/OUTPUT:    Intake/Output Summary (Last 24 hours) at 10/7/2024 1023  Last data filed at 10/7/2024 0949  Gross per 24 hour   Intake 884.47 ml   Output 3490 ml   Net -2605.53 ml       Patient Vitals for the past 96 hrs (Last 3 readings):   Weight   10/07/24 0600 73.9 kg (162 lb 14.7 oz)   10/06/24 0200 74 kg (163 lb 2.3 oz)       General: Alert, Awake, NAD, Obese  HEENT: Normocephalic, atraumatic  Chest: clear to  auscultation, no intercostal retractions  CVS: RRR, no murmur, no rub  Abdomen: soft, non tender  Extremities: no edema/ACE wrap RLE  Skin: normal texture, normal skin turgor, no rash  Neurological: no focal motor neurological deficit  Psych: normal affect; O x 3  : bassett in place; no hematuria noted    Allergies:  Allergies   Allergen Reactions    Duloxetine Hcl Anxiety    Meperidine Anxiety and Other (See Comments)     peridoxal reaction    Hyperactive for days      Tramadol Nausea Only and Nausea And Vomiting          LAB DATA:    CBC:   Lab Results   Component Value Date/Time    WBC 5.7 10/07/2024 04:05 AM    RBC 3.33 10/07/2024 04:05 AM    HGB 11.5 10/07/2024 04:05 AM    HCT 32.8 10/07/2024 04:05 AM    MCV 98.6 10/07/2024 04:05 AM    MCH 34.6 10/07/2024 04:05 AM    MCHC 35.1 10/07/2024 04:05 AM    RDW 13.0 10/07/2024 04:05 AM     10/07/2024 04:05 AM    MPV 7.6 10/07/2024 04:05 AM     BMP:    Lab Results   Component Value Date/Time     10/07/2024 04:05 AM    K 3.9 10/07/2024 04:05 AM    CL 91 10/07/2024 04:05 AM    CO2 24 10/07/2024 04:05 AM    BUN 7 10/07/2024 04:05 AM    CREATININE <0.5 10/07/2024 04:05 AM    CALCIUM 9.0 10/07/2024 04:05 AM    LABGLOM >90 10/07/2024 04:05 AM    GLUCOSE 104 10/07/2024 04:05 AM     Ionized Calcium:  No components found for: \"IONCA\"  Magnesium:    Lab Results   Component Value Date/Time    MG 1.89 10/06/2024 02:13 AM     Phosphorus:  No results found for: \"PHOS\"  U/A:    Lab Results   Component Value Date/Time    COLORU Yellow 10/06/2024 04:00 PM    PHUR 5.5 10/06/2024 04:00 PM    WBCUA 5 10/06/2024 04:00 PM    RBCUA 2 10/06/2024 04:00 PM    BACTERIA 1+ 10/06/2024 04:00 PM    CLARITYU Clear 10/06/2024 04:00 PM    LEUKOCYTESUR TRACE 10/06/2024 04:00 PM    UROBILINOGEN 1.0 10/06/2024 04:00 PM    BILIRUBINUR Negative 10/06/2024 04:00 PM    BLOODU Negative 10/06/2024 04:00 PM    GLUCOSEU Negative 10/06/2024 04:00 PM         IMPRESSION/RECOMMENDATIONS:      Principal

## 2024-10-08 PROBLEM — S93.431A SYNDESMOTIC DISRUPTION OF RIGHT ANKLE: Status: ACTIVE | Noted: 2024-10-08

## 2024-10-08 LAB
ANION GAP SERPL CALCULATED.3IONS-SCNC: 12 MMOL/L (ref 3–16)
BASOPHILS # BLD: 0 K/UL (ref 0–0.2)
BASOPHILS NFR BLD: 0.5 %
BUN SERPL-MCNC: 9 MG/DL (ref 7–20)
CALCIUM SERPL-MCNC: 9.6 MG/DL (ref 8.3–10.6)
CHLORIDE SERPL-SCNC: 93 MMOL/L (ref 99–110)
CO2 SERPL-SCNC: 24 MMOL/L (ref 21–32)
CREAT SERPL-MCNC: 0.6 MG/DL (ref 0.6–1.2)
DEPRECATED RDW RBC AUTO: 12.9 % (ref 12.4–15.4)
EOSINOPHIL # BLD: 0 K/UL (ref 0–0.6)
EOSINOPHIL NFR BLD: 0.4 %
GFR SERPLBLD CREATININE-BSD FMLA CKD-EPI: >90 ML/MIN/{1.73_M2}
GLUCOSE SERPL-MCNC: 131 MG/DL (ref 70–99)
HCT VFR BLD AUTO: 34.6 % (ref 36–48)
HGB BLD-MCNC: 11.9 G/DL (ref 12–16)
LYMPHOCYTES # BLD: 1.4 K/UL (ref 1–5.1)
LYMPHOCYTES NFR BLD: 16.5 %
MCH RBC QN AUTO: 34.1 PG (ref 26–34)
MCHC RBC AUTO-ENTMCNC: 34.3 G/DL (ref 31–36)
MCV RBC AUTO: 99.4 FL (ref 80–100)
MONOCYTES # BLD: 0.7 K/UL (ref 0–1.3)
MONOCYTES NFR BLD: 8.5 %
NEUTROPHILS # BLD: 6.4 K/UL (ref 1.7–7.7)
NEUTROPHILS NFR BLD: 74.1 %
PLATELET # BLD AUTO: 202 K/UL (ref 135–450)
PMV BLD AUTO: 7.4 FL (ref 5–10.5)
POTASSIUM SERPL-SCNC: 3.8 MMOL/L (ref 3.5–5.1)
RBC # BLD AUTO: 3.49 M/UL (ref 4–5.2)
REASON FOR REJECTION: NORMAL
REJECTED TEST: NORMAL
SODIUM SERPL-SCNC: 129 MMOL/L (ref 136–145)
SODIUM SERPL-SCNC: 129 MMOL/L (ref 136–145)
SODIUM SERPL-SCNC: 130 MMOL/L (ref 136–145)
SODIUM SERPL-SCNC: 134 MMOL/L (ref 136–145)
SODIUM SERPL-SCNC: 135 MMOL/L (ref 136–145)
WBC # BLD AUTO: 8.7 K/UL (ref 4–11)

## 2024-10-08 PROCEDURE — 6370000000 HC RX 637 (ALT 250 FOR IP): Performed by: INTERNAL MEDICINE

## 2024-10-08 PROCEDURE — 85025 COMPLETE CBC W/AUTO DIFF WBC: CPT

## 2024-10-08 PROCEDURE — 80048 BASIC METABOLIC PNL TOTAL CA: CPT

## 2024-10-08 PROCEDURE — 6360000002 HC RX W HCPCS: Performed by: INTERNAL MEDICINE

## 2024-10-08 PROCEDURE — 6360000002 HC RX W HCPCS

## 2024-10-08 PROCEDURE — 36415 COLL VENOUS BLD VENIPUNCTURE: CPT

## 2024-10-08 PROCEDURE — 84295 ASSAY OF SERUM SODIUM: CPT

## 2024-10-08 PROCEDURE — 94760 N-INVAS EAR/PLS OXIMETRY 1: CPT

## 2024-10-08 PROCEDURE — 2580000003 HC RX 258: Performed by: INTERNAL MEDICINE

## 2024-10-08 PROCEDURE — 2060000000 HC ICU INTERMEDIATE R&B

## 2024-10-08 PROCEDURE — 97530 THERAPEUTIC ACTIVITIES: CPT

## 2024-10-08 PROCEDURE — 97166 OT EVAL MOD COMPLEX 45 MIN: CPT

## 2024-10-08 PROCEDURE — 6370000000 HC RX 637 (ALT 250 FOR IP)

## 2024-10-08 PROCEDURE — 97161 PT EVAL LOW COMPLEX 20 MIN: CPT

## 2024-10-08 PROCEDURE — 2580000003 HC RX 258: Performed by: ANESTHESIOLOGY

## 2024-10-08 RX ORDER — SODIUM CHLORIDE 1 G/1
1 TABLET ORAL
Status: DISCONTINUED | OUTPATIENT
Start: 2024-10-08 | End: 2024-10-11 | Stop reason: HOSPADM

## 2024-10-08 RX ADMIN — METOPROLOL TARTRATE 25 MG: 25 TABLET, FILM COATED ORAL at 20:22

## 2024-10-08 RX ADMIN — TROSPIUM CHLORIDE 20 MG: 20 TABLET, FILM COATED ORAL at 20:22

## 2024-10-08 RX ADMIN — MORPHINE SULFATE 2 MG: 2 INJECTION, SOLUTION INTRAMUSCULAR; INTRAVENOUS at 12:50

## 2024-10-08 RX ADMIN — ACETAMINOPHEN 325MG 650 MG: 325 TABLET ORAL at 20:21

## 2024-10-08 RX ADMIN — METOPROLOL TARTRATE 25 MG: 25 TABLET, FILM COATED ORAL at 08:59

## 2024-10-08 RX ADMIN — BUSPIRONE HYDROCHLORIDE 10 MG: 10 TABLET ORAL at 08:59

## 2024-10-08 RX ADMIN — ACETAMINOPHEN 1000 MG: 500 TABLET ORAL at 06:31

## 2024-10-08 RX ADMIN — KETOROLAC TROMETHAMINE 15 MG: 15 INJECTION, SOLUTION INTRAMUSCULAR; INTRAVENOUS at 01:52

## 2024-10-08 RX ADMIN — PANTOPRAZOLE SODIUM 40 MG: 40 TABLET, DELAYED RELEASE ORAL at 06:31

## 2024-10-08 RX ADMIN — ENOXAPARIN SODIUM 40 MG: 100 INJECTION SUBCUTANEOUS at 08:59

## 2024-10-08 RX ADMIN — SODIUM CHLORIDE, PRESERVATIVE FREE 10 ML: 5 INJECTION INTRAVENOUS at 21:00

## 2024-10-08 RX ADMIN — BUSPIRONE HYDROCHLORIDE 10 MG: 10 TABLET ORAL at 20:21

## 2024-10-08 RX ADMIN — KETOROLAC TROMETHAMINE 15 MG: 15 INJECTION, SOLUTION INTRAMUSCULAR; INTRAVENOUS at 16:05

## 2024-10-08 RX ADMIN — Medication 1 G: at 16:09

## 2024-10-08 RX ADMIN — Medication 6 MG: at 01:54

## 2024-10-08 RX ADMIN — Medication 6 MG: at 23:48

## 2024-10-08 RX ADMIN — ACETAMINOPHEN 325MG 650 MG: 325 TABLET ORAL at 09:17

## 2024-10-08 RX ADMIN — SODIUM CHLORIDE, PRESERVATIVE FREE 10 ML: 5 INJECTION INTRAVENOUS at 20:23

## 2024-10-08 RX ADMIN — KETOROLAC TROMETHAMINE 15 MG: 15 INJECTION, SOLUTION INTRAMUSCULAR; INTRAVENOUS at 23:47

## 2024-10-08 ASSESSMENT — PAIN DESCRIPTION - ORIENTATION
ORIENTATION: RIGHT

## 2024-10-08 ASSESSMENT — PAIN DESCRIPTION - LOCATION
LOCATION: KNEE
LOCATION: ANKLE;KNEE
LOCATION: LEG;ANKLE
LOCATION: ANKLE;LEG
LOCATION: ANKLE
LOCATION: ANKLE;KNEE
LOCATION: ANKLE

## 2024-10-08 ASSESSMENT — PAIN DESCRIPTION - ONSET: ONSET: GRADUAL

## 2024-10-08 ASSESSMENT — PAIN DESCRIPTION - DESCRIPTORS
DESCRIPTORS: ACHING
DESCRIPTORS: ACHING;SHARP
DESCRIPTORS: SHARP
DESCRIPTORS: SHARP;SHOOTING
DESCRIPTORS: SHOOTING

## 2024-10-08 ASSESSMENT — PAIN SCALES - GENERAL
PAINLEVEL_OUTOF10: 6
PAINLEVEL_OUTOF10: 6
PAINLEVEL_OUTOF10: 3
PAINLEVEL_OUTOF10: 6
PAINLEVEL_OUTOF10: 7
PAINLEVEL_OUTOF10: 8
PAINLEVEL_OUTOF10: 7
PAINLEVEL_OUTOF10: 7

## 2024-10-08 ASSESSMENT — PAIN - FUNCTIONAL ASSESSMENT
PAIN_FUNCTIONAL_ASSESSMENT: ACTIVITIES ARE NOT PREVENTED
PAIN_FUNCTIONAL_ASSESSMENT: ACTIVITIES ARE NOT PREVENTED
PAIN_FUNCTIONAL_ASSESSMENT: PREVENTS OR INTERFERES SOME ACTIVE ACTIVITIES AND ADLS
PAIN_FUNCTIONAL_ASSESSMENT: PREVENTS OR INTERFERES WITH ALL ACTIVE AND SOME PASSIVE ACTIVITIES
PAIN_FUNCTIONAL_ASSESSMENT: PREVENTS OR INTERFERES WITH ALL ACTIVE AND SOME PASSIVE ACTIVITIES

## 2024-10-08 ASSESSMENT — PAIN DESCRIPTION - FREQUENCY: FREQUENCY: CONTINUOUS

## 2024-10-08 ASSESSMENT — PAIN DESCRIPTION - PAIN TYPE: TYPE: ACUTE PAIN

## 2024-10-08 NOTE — PROGRESS NOTES
Nephrology Progress Note   [facility]      Reason for Consultation: Hyponatremia    History of Present Illness: Ms Conrad is a 74 female with past medical history significant for hypertension, depression/anxiety, pain, tardive dyskinesia, came to ER after having a fall. The patient was diagnosed with right ankle fracture. Nephrology consulted for Hyponatremia    Subjective:    In bed; NAD    Scheduled Meds:   sodium chloride  1 g Oral TID WC    sodium chloride flush  5-40 mL IntraVENous 2 times per day    sodium chloride flush  5-40 mL IntraVENous 2 times per day    enoxaparin  40 mg SubCUTAneous Daily    busPIRone  10 mg Oral BID    pantoprazole  40 mg Oral QAM AC    Valbenazine Tosylate  40 mg Oral Daily    metoprolol tartrate  25 mg Oral BID    gabapentin  300 mg Oral Every Other Day    trospium  20 mg Oral Nightly        sodium chloride      sodium chloride         PRN Meds:.sodium chloride flush, sodium chloride, prochlorperazine, sodium chloride flush, sodium chloride, potassium chloride **OR** potassium alternative oral replacement **OR** potassium chloride, magnesium sulfate, ondansetron **OR** ondansetron, polyethylene glycol, acetaminophen **OR** acetaminophen, melatonin, ketorolac, zolpidem, morphine    Physical Exam:    TEMPERATURE:  Current - Temp: 97.9 °F (36.6 °C); Max - Temp  Av.9 °F (36.6 °C)  Min: 97.3 °F (36.3 °C)  Max: 98.3 °F (36.8 °C)  RESPIRATIONS RANGE: Resp  Av.9  Min: 12  Max: 23  PULSE RANGE: Pulse  Av.5  Min: 83  Max: 113  BLOOD PRESSURE RANGE:  Systolic (24hrs), Av , Min:126 , Max:173   ; Diastolic (24hrs), Av, Min:68, Max:97    24HR INTAKE/OUTPUT:    Intake/Output Summary (Last 24 hours) at 10/8/2024 1601  Last data filed at 10/8/2024 1420  Gross per 24 hour   Intake 2871.22 ml   Output 3625 ml   Net -753.78 ml       Patient Vitals for the past 96 hrs (Last 3 readings):   Weight   10/08/24 0359 77.1 kg (169 lb 15.6 oz)   10/07/24 0600 73.9 kg (162 lb 14.7

## 2024-10-08 NOTE — PROGRESS NOTES
Physical Therapy  Facility/Department: [address] PROGRESSIVE CARE  Physical Therapy Initial Assessment    Name: Little Conrad  : 1950  MRN: [id number]  Date of Service: 10/8/2024    Discharge Recommendations:  Continue to assess pending progress, IP Rehab, Patient would benefit from continued therapy after discharge, 24 hour supervision or assist, Home with Home health PT   PT Equipment Recommendations  Equipment Needed: Yes  Mobility Devices: Wheelchair;Walker  Walker: Rolling  Wheelchair: Wheelchair Cushion Standard  Other: RLE elvating leg rest; iqra RW      Patient Diagnosis(es): The primary encounter diagnosis was Closed fracture dislocation of right ankle, initial encounter. A diagnosis of Hyponatremia was also pertinent to this visit.  Past Medical History:  has a past medical history of Acid reflux, Depression, GERD (gastroesophageal reflux disease), Hypertension, IBS (irritable bowel syndrome), Insomnia, Panic attacks, and Tardive dyskinesia.  Past Surgical History:  has a past surgical history that includes Colonoscopy; Hysterectomy; Breast surgery; back surgery; shoulder surgery (Right); Foot surgery (Left); Colonoscopy (N/A, 10/18/2023); Upper gastrointestinal endoscopy (N/A, 10/18/2023); and Ankle fracture surgery (Right, 10/7/2024).    Assessment  Body Structures, Functions, Activity Limitations Requiring Skilled Therapeutic Intervention: Decreased functional mobility ;Decreased endurance;Decreased strength;Increased pain;Decreased ADL status;Decreased balance  Assessment: Pt is a 74 y.o. F. admitted 10/6 s/p fall, R ankle fx, underwent ORIF 10/7, now NWB RLE x 6 weeks.  She presents pleasant and agreeable to evaluation, c/o R ankle pain.  She was able to complete transfers with CGA, but did require Min A to attempt very short-distance hopping with walker.  Activity tolerance limited by pain and fatigue.  Pt requires additional therapy in the acute setting to improve strength, balance,

## 2024-10-08 NOTE — BRIEF OP NOTE
Brief Postoperative Note      Patient: Little Conrad  YOB: 1950  MRN: [id number]    Date of Procedure: 10/7/2024    Pre-Op Diagnosis Codes:      * Closed fracture of right ankle, initial encounter [S82.891A]    Post-Op Diagnosis: Same       Procedure(s):  OPEN REDUCTION INTERNAL FIXATION RIGHT ANKLE FRACTURE DISLOCATION WITH SYNDESMOSIS REPAIR.    Surgeon(s):  Delisa Landis MD    Assistant: DINA Cramer    Anesthesia: General    Estimated Blood Loss (mL): Minimal    Complications: None    Specimens:   * No specimens in log *    Implants:  Implant Name Type Inv. Item Serial No.  Lot No. LRB No. Used Action   K WIRE FIX L150MM DIA1.6MM TRCR PNT 1 END FOR SM FRAG UNIV - [id number]  K WIRE FIX L150MM DIA1.6MM TRCR PNT 1 END FOR SM FRAG UNIV  [employer]WD  Right 1 Implanted   PLATE BNE L80MM 4 H R LAT DST PERIARTC FIBULAR S STL LUIGI - [id number]  PLATE BNE L80MM 4 H R LAT DST PERIARTC FIBULAR S STL LUIGI  [employer]WD  Right 1 Implanted   SCREW BNE L14MM DIA3.5MM HD DIA2.7MM STEVE PERIARTC S STL ST - [id number]  SCREW BNE L14MM DIA3.5MM HD DIA2.7MM STEVE PERIARTC S STL ST  [employer]WD  Right 1 Implanted   SCREW BNE L18MM DIA2.7MM HEX HD DIA2.5MM CANC BIODUR 108C - [id number]  SCREW BNE L18MM DIA2.7MM HEX HD DIA2.5MM CANC BIODUR 108C  [employer]WD  Right 3 Implanted   SCREW BNE L16MM DIA2.7MM HEX HD DIA2.5MM CANC BIODUR 108C - [id number]  SCREW BNE L16MM DIA2.7MM HEX HD DIA2.5MM CANC BIODUR 108C  [employer]WD  Right 1 Implanted   SCREW BNE L14MM DIA2.7MM HEX HD DIA2.5MM CANC BIODUR 108C - [id number]  SCREW BNE L14MM DIA2.7MM HEX HD DIA2.5MM CANC BIODUR 108C  [employer]WD  Right 1 Implanted   SCREW BNE L48MM DIA3.5MM STEVE PERIARTC S STL ST - [id number]  SCREW BNE L48MM DIA3.5MM STEVE PERIARTC S STL ST  MAR[employer] TRAUMA-WD  Right 1 Implanted   SCREW BNE L44MM DIA3.5MM STEVE S STL ST NONCANNULATED IM - [id number]  SCREW BNE

## 2024-10-08 NOTE — PROGRESS NOTES
Name:  Little Conrad /Age/Sex: 1950  (74 y.o. female)   MRN & CSN:  [id number] & [id number] Encounter Date/Time: 10/8/2024 1:09 PM EDT   Location:  [id number]/5280-01 PCP: Fredis Smiley MD     Attending:Brandee Christianson MD       Little Conrad is a 74 y.o. female with  has a past medical history of Acid reflux, Depression, GERD (gastroesophageal reflux disease), Hypertension, IBS (irritable bowel syndrome), Insomnia, Panic attacks, and Tardive dyskinesia. who presents with Hyponatremia    Hospital Day: 3      Interval history:     Seen and examined at bedside. S/p ORIF ankle yesterday. Pain better controlled. Started on D5 due to abrupt increase in Na yesterday    Review of Systems:      10 point ROS negative except stated above    Objective:     Intake/Output Summary (Last 24 hours) at 10/8/2024 1216  Last data filed at 10/8/2024 1201  Gross per 24 hour   Intake 3031.22 ml   Output 4775 ml   Net -1743.78 ml      Vitals:   Vitals:    10/08/24 0359 10/08/24 0722 10/08/24 0900 10/08/24 1122   BP:  (!) 173/93  (!) 147/76   Pulse:  97 98 83   Resp:  20 23 20   Temp:  98 °F (36.7 °C)  97.9 °F (36.6 °C)   TempSrc:  Oral  Oral   SpO2:  96% 95% 99%   Weight: 77.1 kg (169 lb 15.6 oz)      Height:             Physical Exam:        General: NAD  Eyes: EOMI  ENT: neck supple  Cardiovascular: Regular rate.  Respiratory: Clear to auscultation  Gastrointestinal: Soft, non tender  Genitourinary: no suprapubic tenderness  Musculoskeletal: Lower extremities wrapped  Neuro: Alert.  Psych: Mood appropriate.       Medications:   Medications:    sodium chloride flush  5-40 mL IntraVENous 2 times per day    sodium chloride flush  5-40 mL IntraVENous 2 times per day    enoxaparin  40 mg SubCUTAneous Daily    busPIRone  10 mg Oral BID    pantoprazole  40 mg Oral QAM AC    Valbenazine Tosylate  40 mg Oral Daily    metoprolol tartrate  25 mg Oral BID    acetaminophen  1,000 mg Oral 3 times per day    gabapentin  300 mg Oral  PROVIDED HISTORY: deformity TECHNOLOGIST PROVIDED HISTORY: Reason for exam:->deformity Reason for Exam: deformity FINDINGS: Two views of the right ankle demonstrate acute displaced fracture of the distal fibula.  There is severe ankle dislocation with lateral displacement of the talus relative to the tibial plafond.  No obvious fracture of the tibia is seen on limited views. Diffuse ankle soft tissue swelling.     1. Severe ankle dislocation with lateral displacement of the talus relative to the tibial plafond. 2. Acute displaced fracture of the distal fibula. 3. Diffuse ankle soft tissue swelling.     CT ANKLE RIGHT WO CONTRAST    Result Date: 10/6/2024  EXAMINATION: CT OF THE RIGHT ANKLE WITHOUT CONTRAST 10/6/2024 3:27 am TECHNIQUE: CT of the right ankle was performed without the administration of intravenous contrast.  Multiplanar reformatted images are provided for review. Automated exposure control, iterative reconstruction, and/or weight based adjustment of the mA/kV was utilized to reduce the radiation dose to as low as reasonably achievable. COMPARISON: 26 October 2024 HISTORY ORDERING SYSTEM PROVIDED HISTORY: ankle injury TECHNOLOGIST PROVIDED HISTORY: Reason for exam:->ankle injury Decision Support Exception - unselect if not a suspected or confirmed emergency medical condition->Emergency Medical Condition (MA) FINDINGS: The ankle joint has been reduced.  The oblique fracture through the distal fibula is in ear alignment and positioning.  There is avulsion fragments off the posterior lip of the tibia and small tiny avulsion fragments surrounding the medial malleolus at the deltoid insertion.  Tiny ossification fragments are also identified anteriorly which may reflect capsular injury.  There is also probable os ossific fragments along the expected location of the anterior inferior tibiofibular joint space.  Marked soft tissue swelling circumferentially around the ankle.  No large effusion.     Satisfactory

## 2024-10-08 NOTE — CONSULTS
[facility] Orthopedic Surgery  Consult Note         This patient is seen in consultation at the request of Brandee Ortega MD     Reason for Consult:  Right ankle pain /  comminuted ankle fracture dislocation.    CHIEF COMPLAINT:  Right ankle pain /  comminuted ankle fracture dislocation.    History Obtained From:  patient, electronic medical record    HISTORY OF PRESENT ILLNESS:    Ms. Conrad 74 y.o.  female seen today at [place] [facility] for evaluation of a right ankle injury which occurred when she was cleaning beer from the [facility] and slipped on 10/6/2024.  She was first seen and evaluated in [place] [facility], came via EMS, where she was x-rayed, reduced, splinted and admitted with Orthopedic consultation. She is complaining of medial & lateral ankle pain and swelling 8/10. This is better with elevation and worse with bearing any wt. The pain is sharp and not radiating. No numbness or tingling sensation. Alleviating factors: rest. No other complaint.     Past Medical History:        Diagnosis Date    Acid reflux     Depression     GERD (gastroesophageal reflux disease)     Hypertension     IBS (irritable bowel syndrome)     Insomnia     Panic attacks     Tardive dyskinesia        Past Surgical History:        Procedure Laterality Date    ANKLE FRACTURE SURGERY Right 10/7/2024    OPEN REDUCTION INTERNAL FIXATION RIGHT ANKLE FRACTURE DISLOCATION performed by Delisa Landis MD at [facility] OR    BACK SURGERY      cervical    BREAST SURGERY      lumpectomy    COLONOSCOPY      COLONOSCOPY N/A 10/18/2023    COLONOSCOPY POLYPECTOMY SNARE/COLD BIOPSY performed by Jyotsna Quintana MD at [facility] ENDOSCOPY    FOOT SURGERY Left     HYSTERECTOMY (CERVIX STATUS UNKNOWN)      SHOULDER SURGERY Right     rotator cuff    UPPER GASTROINTESTINAL ENDOSCOPY N/A 10/18/2023    EGD BIOPSY performed by Jyotsna Quintana MD at [facility] ENDOSCOPY       Medications prior to admission:   Prior to Admission medications    Medication Sig Start Date End Date  Taking? Authorizing Provider   gabapentin (NEURONTIN) 300 MG capsule Take 1 capsule by mouth every other day.   Yes Aaron Myles MD   solifenacin (VESICARE) 10 MG tablet Take 1 tablet by mouth daily   Yes Aaron Myles MD   zolpidem (AMBIEN) 10 MG tablet Take 1 tablet by mouth nightly as needed for Sleep.   Yes Aaron Myles MD   calcium carbonate (OSCAL) 500 MG TABS tablet Take 1 tablet by mouth daily   Yes Aaron Myles MD   busPIRone (BUSPAR) 10 MG tablet Take 1 tablet by mouth 2 times daily 4/21/23  Yes Aaron Myles MD   cyanocobalamin 500 MCG tablet Take 1 tablet by mouth once a week   Yes Aaron Myles MD   Flaxseed, Linseed, (FLAX SEED OIL) 1000 MG CAPS Take by mouth daily   Yes Aaron Myles MD   irbesartan (AVAPRO) 150 MG tablet Take 1 tablet by mouth daily 8/10/23  Yes Aaron Myles MD   melatonin 5 MG TABS tablet Take 1 tablet by mouth nightly as needed   Yes Aaron Myles MD   pantoprazole (PROTONIX) 40 MG tablet Take 1 tablet by mouth in the morning and at bedtime   Yes Aaron Myles MD   Omega-3 Fatty Acids (FISH OIL) 1000 MG capsule Take 1 capsule by mouth daily   Yes Aaron Myles MD   Valbenazine Tosylate 40 MG CAPS Take 1 capsule by mouth daily 9/5/23  Yes Aaron Myles MD   METAMUCIL FIBER PO Take 1 packet by mouth daily as needed (constipation)   Yes Aaron Myles MD   mirabegron (MYRBETRIQ) 25 MG TB24 Take 1 tablet by mouth daily  Patient not taking: Reported on 10/6/2024    Aaron Myles MD   glycopyrrolate (ROBINUL) 2 MG tablet Take 1 tablet by mouth daily  Patient not taking: Reported on 10/6/2024    Aaron Myles MD   ondansetron (ZOFRAN) 4 MG tablet Take 1 tablet by mouth every 8 hours as needed 9/5/23   Aaron Myles MD       Current Medications:   Current Facility-Administered Medications: naloxone 0.4 mg in 10 mL sodium chloride syringe, , IntraVENous,

## 2024-10-08 NOTE — PROGRESS NOTES
NAME:  Little Conrad  YOB: 1950  MEDICAL RECORD NUMBER:  [id number]    Shift Summary: Pt to OR for ORIF right ankle at 1300. Na levels increased from 124 to 132. D5 started at 100ml/hr. New IV placed, see avatar. VSS.     Family updated: Yes:  , by patient    Rhythm: Sinus Tachycardia     Most recent vitals:   Visit Vitals  BP (!) 149/93   Pulse (!) 113   Temp 98.3 °F (36.8 °C) (Oral)   Resp 22   Ht 1.549 m (5' 1\")   Wt 73.9 kg (162 lb 14.7 oz)   SpO2 96%   BMI 30.78 kg/m²           No data found.    No data found.      Respiratory support needed (if any):  - RA    Admission weight Weight - Scale: 74 kg (163 lb 2.3 oz) (10/06/24 0200)    Today's weight    Wt Readings from Last 1 Encounters:   10/07/24 73.9 kg (162 lb 14.7 oz)        Bassett need assessed each shift: YES -  - continue bassett r/t - strict I/Os  UOP >30ml/hr: YES  Last documented BM (in last 48 hrs):  No data found.             Restraints (in use currently or dc'd in last 12 hrs): No    Order current and documentation up to date? No    Lines/Drains reviewed @ bedside.  Peripheral IV 10/06/24 Right Forearm (Active)   Number of days: 1       Peripheral IV 10/07/24 Left;Anterior Forearm (Active)   Number of days: 0       Urinary Catheter 10/06/24 Bassett (Active)   Number of days: 1         Drip rates at handoff:    sodium chloride      dextrose 100 mL/hr at 10/07/24 1819    sodium chloride         Lab Data:   CBC:   Recent Labs     10/06/24  0213 10/07/24  0405   WBC 6.1 5.7   HGB 12.3 11.5*   HCT 35.9* 32.8*   MCV 99.2 98.6    174     BMP:    Recent Labs     10/06/24  1058 10/06/24  1635 10/07/24  0405 10/07/24  1047 10/07/24  1655   *   < > 124* 132* 134*   K 4.7  --  3.9  --   --    CO2 23  --  24  --   --    BUN 12  --  7  --   --    CREATININE 0.6  --  <0.5*  --   --     < > = values in this interval not displayed.     LIVR:   Recent Labs     10/06/24  0213   AST 27   ALT 18     PT/INR: No results for input(s):

## 2024-10-08 NOTE — OP NOTE
[facility]          [address] [place] [address]                            OPERATIVE REPORT      PATIENT NAME: FREYA BERNARD           : 1950  MED REC NO: [id number]                      ROOM: [place] [address]  ACCOUNT NO: [id number]                       ADMIT DATE: 10/06/2024  PROVIDER: Delisa Landis MD      DATE OF PROCEDURE:  10/07/2024    SURGEON:  Delisa Landis MD    ASSISTANT:  Deandra Cramer CNP.    PREOPERATIVE DIAGNOSES:    1. Right ankle comminuted unstable fracture dislocation.  2. Right ankle distal tibia-fibular syndesmosis disruption.    POSTOPERATIVE DIAGNOSES:    1. Right ankle comminuted unstable fracture dislocation.  2. Right ankle distal tibia-fibular syndesmosis disruption.    PROCEDURE DONE:    1. Open treatment of right ankle comminuted unstable fracture dislocation with open reduction and internal fixation.  2. Open treatment of right ankle distal tibia-fibular syndesmosis disruption with syndesmosis screw.    ANESTHESIA:  General anesthesia.    ESTIMATED BLOOD LOSS:  Minimal.    COMPLICATIONS:  None.    TOURNIQUET:  Right upper thigh 300 mmHg.    IMPLANT USED:    1. Simran distal fibula locking plate.  2. Simran 3.5 cortical screws x2 for syndesmosis repair.    INDICATION:  This is a 74-year-old white female who was cleaning the floor and slipped sustaining twisting injury to her right ankle.  She was then brought by EMS to [facility] on 10/06/2024.  She was found to have a right ankle comminuted unstable fracture dislocation.  She underwent closed reduction and splinting in the ER and was admitted to the hospital for orthopedic consultation.  All risks, benefits, and alternatives were discussed with the patient.  She agreed to proceed with surgical fixation.    Given the patient's Body mass index is 32.12 kg/m².and comminuted unstable fracture dislocation added significant challenge to the procedure. It required significant  Xeroform, 4 x 4s, sterile Webril, and a splint was applied.    The patient tolerated the procedure well, was taken to the Recovery in stable condition.    Deandra Cramer CNP was 1st Assist given the nature of the procedure that needed advanced assistance. She assisted in all aspect of the procedure, including but limited to draping in a sterile fashion, exposure of surgical area, controlling bleeding, retracting and protecting important structures, achieve and maintain bone reduction and surgical wound closure with dressing application.    POSTOPERATIVE PLAN:  The patient will be readmitted as an inpatient.  She is nonweightbearing for at least 6 weeks, but we can start range of motion of the ankle in 2 weeks.  She may need a rehab placement.          DEBBY DIANA MD      D:  10/08/2024 07:01:42     T:  10/08/2024 07:32:29     /EMILI  Job #:  [id number]     Doc#:  [id number]

## 2024-10-08 NOTE — PROGRESS NOTES
Occupational Therapy  Facility/Department: [address] PROGRESSIVE CARE  Occupational Therapy Initial Assessment    Name: Little Conrad  : 1950  MRN: [id number]  Date of Service: 10/8/2024    Discharge Recommendations:  5-7 sessions per week, 24 hour supervision or assist, 2-3 sessions per week, Patient would benefit from continued therapy after discharge, Continue to assess pending progress     Little Conrad scored a 16/24 on the AM-PAC ADL Inpatient form. Current research shows that an AM-PAC score of 17 or less is typically not associated with a discharge to the patient's home setting. Based on the patient's AM-PAC score and their current ADL deficits, it is recommended that the patient have 5-7 sessions per week of Occupational Therapy at d/c to increase the patient's independence.  At this time, this patient demonstrates complex nursing, medical, and rehabilitative needs, and would benefit from intensive rehabilitation services upon discharge from the Inpatient setting.  This patient demonstrates the ability to participate in and benefit from an intensive therapy program with a coordinated interdisciplinary team approach to foster frequent, structured, and documented communication among disciplines, who will work together to establish, prioritize, and achieve treatment goals. Please see assessment section for further patient specific details.    If patient discharges prior to next session this note will serve as a discharge summary.  Please see below for the latest assessment towards goals.       Patient Diagnosis(es): The primary encounter diagnosis was Closed fracture dislocation of right ankle, initial encounter. A diagnosis of Hyponatremia was also pertinent to this visit.  Past Medical History:  has a past medical history of Acid reflux, Depression, GERD (gastroesophageal reflux disease), Hypertension, IBS (irritable bowel syndrome), Insomnia, Panic attacks, and Tardive dyskinesia.  Past

## 2024-10-08 NOTE — PROGRESS NOTES
Patient admitted to 5280 with all belongings, VSS, a&o x4, denied pain. Patient oriented to room and how to use call light, surgical dressing clean, dry, intact, f/c patent and draining, VSS applied. Pt denied concern at this time.    Electronically signed by Loki Truong RN on 10/7/2024 at 9:57 PM

## 2024-10-08 NOTE — PLAN OF CARE
Problem: Safety - Adult  Goal: Free from fall injury  10/7/2024 2114 by Milvia Birmingham RN  Outcome: Progressing     Problem: Discharge Planning  Goal: Discharge to home or other facility with appropriate resources  10/7/2024 2114 by Milvia Birmingham RN  Outcome: Progressing  Flowsheets (Taken 10/7/2024 1948)  Discharge to home or other facility with appropriate resources:   Identify barriers to discharge with patient and caregiver   Arrange for needed discharge resources and transportation as appropriate   Identify discharge learning needs (meds, wound care, etc)   Arrange for interpreters to assist at discharge as needed   Refer to discharge planning if patient needs post-hospital services based on physician order or complex needs related to functional status, cognitive ability or social support system     Problem: Pain  Goal: Verbalizes/displays adequate comfort level or baseline comfort level  10/7/2024 2114 by Milvia Birmingham RN  Outcome: Progressing     Problem: Skin/Tissue Integrity  Goal: Absence of new skin breakdown  Description: 1.  Monitor for areas of redness and/or skin breakdown  2.  Assess vascular access sites hourly  3.  Every 4-6 hours minimum:  Change oxygen saturation probe site  4.  Every 4-6 hours:  If on nasal continuous positive airway pressure, respiratory therapy assess nares and determine need for appliance change or resting period.  10/7/2024 2114 by Milvia Birmingham RN  Outcome: Progressing

## 2024-10-09 LAB
ALBUMIN SERPL-MCNC: 3.8 G/DL (ref 3.4–5)
ANION GAP SERPL CALCULATED.3IONS-SCNC: 10 MMOL/L (ref 3–16)
BASOPHILS # BLD: 0 K/UL (ref 0–0.2)
BASOPHILS NFR BLD: 0.6 %
BUN SERPL-MCNC: 12 MG/DL (ref 7–20)
CALCIUM SERPL-MCNC: 9.3 MG/DL (ref 8.3–10.6)
CHLORIDE SERPL-SCNC: 100 MMOL/L (ref 99–110)
CO2 SERPL-SCNC: 27 MMOL/L (ref 21–32)
CREAT SERPL-MCNC: 0.6 MG/DL (ref 0.6–1.2)
DEPRECATED RDW RBC AUTO: 13.4 % (ref 12.4–15.4)
EOSINOPHIL # BLD: 0.1 K/UL (ref 0–0.6)
EOSINOPHIL NFR BLD: 1.3 %
GFR SERPLBLD CREATININE-BSD FMLA CKD-EPI: >90 ML/MIN/{1.73_M2}
GLUCOSE SERPL-MCNC: 105 MG/DL (ref 70–99)
HCT VFR BLD AUTO: 32 % (ref 36–48)
HGB BLD-MCNC: 11.2 G/DL (ref 12–16)
LYMPHOCYTES # BLD: 1.5 K/UL (ref 1–5.1)
LYMPHOCYTES NFR BLD: 23.3 %
MAGNESIUM SERPL-MCNC: 2 MG/DL (ref 1.8–2.4)
MCH RBC QN AUTO: 34.9 PG (ref 26–34)
MCHC RBC AUTO-ENTMCNC: 35 G/DL (ref 31–36)
MCV RBC AUTO: 99.5 FL (ref 80–100)
MONOCYTES # BLD: 0.7 K/UL (ref 0–1.3)
MONOCYTES NFR BLD: 11.8 %
NEUTROPHILS # BLD: 4 K/UL (ref 1.7–7.7)
NEUTROPHILS NFR BLD: 63 %
OSMOLALITY UR: 256 MOSM/KG (ref 390–1070)
PHOSPHATE SERPL-MCNC: 4.6 MG/DL (ref 2.5–4.9)
PLATELET # BLD AUTO: 187 K/UL (ref 135–450)
PMV BLD AUTO: 7.2 FL (ref 5–10.5)
POTASSIUM SERPL-SCNC: 4.1 MMOL/L (ref 3.5–5.1)
RBC # BLD AUTO: 3.22 M/UL (ref 4–5.2)
SODIUM SERPL-SCNC: 135 MMOL/L (ref 136–145)
SODIUM SERPL-SCNC: 137 MMOL/L (ref 136–145)
SODIUM SERPL-SCNC: 138 MMOL/L (ref 136–145)
SODIUM UR-SCNC: 45 MMOL/L
WBC # BLD AUTO: 6.3 K/UL (ref 4–11)

## 2024-10-09 PROCEDURE — 6360000002 HC RX W HCPCS

## 2024-10-09 PROCEDURE — 36415 COLL VENOUS BLD VENIPUNCTURE: CPT

## 2024-10-09 PROCEDURE — 84300 ASSAY OF URINE SODIUM: CPT

## 2024-10-09 PROCEDURE — 6370000000 HC RX 637 (ALT 250 FOR IP)

## 2024-10-09 PROCEDURE — 6370000000 HC RX 637 (ALT 250 FOR IP): Performed by: INTERNAL MEDICINE

## 2024-10-09 PROCEDURE — 97535 SELF CARE MNGMENT TRAINING: CPT

## 2024-10-09 PROCEDURE — 83935 ASSAY OF URINE OSMOLALITY: CPT

## 2024-10-09 PROCEDURE — 83735 ASSAY OF MAGNESIUM: CPT

## 2024-10-09 PROCEDURE — 85025 COMPLETE CBC W/AUTO DIFF WBC: CPT

## 2024-10-09 PROCEDURE — 94760 N-INVAS EAR/PLS OXIMETRY 1: CPT

## 2024-10-09 PROCEDURE — 97530 THERAPEUTIC ACTIVITIES: CPT

## 2024-10-09 PROCEDURE — 2060000000 HC ICU INTERMEDIATE R&B

## 2024-10-09 PROCEDURE — 2580000003 HC RX 258: Performed by: INTERNAL MEDICINE

## 2024-10-09 PROCEDURE — 6360000002 HC RX W HCPCS: Performed by: INTERNAL MEDICINE

## 2024-10-09 PROCEDURE — 2580000003 HC RX 258: Performed by: ANESTHESIOLOGY

## 2024-10-09 PROCEDURE — 80069 RENAL FUNCTION PANEL: CPT

## 2024-10-09 PROCEDURE — 84295 ASSAY OF SERUM SODIUM: CPT

## 2024-10-09 RX ORDER — HYDROCODONE BITARTRATE AND ACETAMINOPHEN 5; 325 MG/1; MG/1
1 TABLET ORAL EVERY 6 HOURS PRN
Status: DISCONTINUED | OUTPATIENT
Start: 2024-10-09 | End: 2024-10-11 | Stop reason: HOSPADM

## 2024-10-09 RX ADMIN — BUSPIRONE HYDROCHLORIDE 10 MG: 10 TABLET ORAL at 08:39

## 2024-10-09 RX ADMIN — ZOLPIDEM TARTRATE 10 MG: 5 TABLET, COATED ORAL at 01:52

## 2024-10-09 RX ADMIN — BUSPIRONE HYDROCHLORIDE 10 MG: 10 TABLET ORAL at 21:12

## 2024-10-09 RX ADMIN — KETOROLAC TROMETHAMINE 15 MG: 15 INJECTION, SOLUTION INTRAMUSCULAR; INTRAVENOUS at 21:15

## 2024-10-09 RX ADMIN — ACETAMINOPHEN 325MG 650 MG: 325 TABLET ORAL at 08:39

## 2024-10-09 RX ADMIN — PANTOPRAZOLE SODIUM 40 MG: 40 TABLET, DELAYED RELEASE ORAL at 05:46

## 2024-10-09 RX ADMIN — SODIUM CHLORIDE, PRESERVATIVE FREE 10 ML: 5 INJECTION INTRAVENOUS at 08:41

## 2024-10-09 RX ADMIN — ACETAMINOPHEN 325MG 650 MG: 325 TABLET ORAL at 17:16

## 2024-10-09 RX ADMIN — Medication 6 MG: at 21:17

## 2024-10-09 RX ADMIN — TROSPIUM CHLORIDE 20 MG: 20 TABLET, FILM COATED ORAL at 21:11

## 2024-10-09 RX ADMIN — SODIUM CHLORIDE, PRESERVATIVE FREE 10 ML: 5 INJECTION INTRAVENOUS at 21:15

## 2024-10-09 RX ADMIN — Medication 1 G: at 08:39

## 2024-10-09 RX ADMIN — MORPHINE SULFATE 2 MG: 2 INJECTION, SOLUTION INTRAMUSCULAR; INTRAVENOUS at 11:45

## 2024-10-09 RX ADMIN — POLYETHYLENE GLYCOL 3350 17 G: 17 POWDER, FOR SOLUTION ORAL at 08:47

## 2024-10-09 RX ADMIN — Medication 1 G: at 17:16

## 2024-10-09 RX ADMIN — METOPROLOL TARTRATE 25 MG: 25 TABLET, FILM COATED ORAL at 21:12

## 2024-10-09 RX ADMIN — GABAPENTIN 300 MG: 300 CAPSULE ORAL at 08:41

## 2024-10-09 RX ADMIN — SODIUM CHLORIDE, PRESERVATIVE FREE 10 ML: 5 INJECTION INTRAVENOUS at 08:32

## 2024-10-09 RX ADMIN — METOPROLOL TARTRATE 25 MG: 25 TABLET, FILM COATED ORAL at 08:39

## 2024-10-09 RX ADMIN — ENOXAPARIN SODIUM 40 MG: 100 INJECTION SUBCUTANEOUS at 08:37

## 2024-10-09 RX ADMIN — Medication 1 G: at 11:46

## 2024-10-09 ASSESSMENT — PAIN DESCRIPTION - LOCATION
LOCATION: ANKLE

## 2024-10-09 ASSESSMENT — PAIN DESCRIPTION - ORIENTATION
ORIENTATION: RIGHT

## 2024-10-09 ASSESSMENT — PAIN DESCRIPTION - DESCRIPTORS
DESCRIPTORS: ACHING;DISCOMFORT

## 2024-10-09 ASSESSMENT — PAIN SCALES - GENERAL
PAINLEVEL_OUTOF10: 3
PAINLEVEL_OUTOF10: 7
PAINLEVEL_OUTOF10: 3
PAINLEVEL_OUTOF10: 4
PAINLEVEL_OUTOF10: 8

## 2024-10-09 ASSESSMENT — PAIN - FUNCTIONAL ASSESSMENT
PAIN_FUNCTIONAL_ASSESSMENT: PREVENTS OR INTERFERES WITH ALL ACTIVE AND SOME PASSIVE ACTIVITIES

## 2024-10-09 NOTE — CARE COORDINATION
RN CM followed up with patient and patient would like to go to a skilled nursing facility. Patient would like a referral to be made to [facility] and [facility]. Referrals sent by [facility]. [facility] will continue to follow up.   Electronically signed by Gini Palmer RN on 10/9/2024 at 4:04 PM

## 2024-10-09 NOTE — CARE COORDINATION
10/09/24 1240   Service Assessment   Patient Orientation Alert and Oriented   Cognition Alert   History Provided By Patient   Primary Caregiver Self   Accompanied By/Relationship no one   Support Systems Spouse/Significant Other;Family Members   Patient's Healthcare Decision Maker is: Legal Next of Kin   PCP Verified by CM Yes   Last Visit to PCP Within last 6 months   Prior Functional Level Independent in ADLs/IADLs   Current Functional Level Assistance with the following:;Mobility;Toileting;Dressing;Housework;Cooking   Can patient return to prior living arrangement Unknown at present   Ability to make needs known: Good   Family able to assist with home care needs: Yes  (spouse- Fredis)   Would you like for me to discuss the discharge plan with any other family members/significant others, and if so, who? No   Financial Resources Medicare   Community Resources None   CM/SW Referral Other (see comment)  (discharge planning)   Discharge Planning   Type of Residence House   Living Arrangements Spouse/Significant Other   Current Services Prior To Admission Durable Medical Equipment   Current DME Prior to Arrival Cane;Walker;Wheelchair;Shower Chair  (Patient's spouse got a walker, wheelchair and shower chair for home)   Potential Assistance Needed Skilled Nursing Facility   DME Ordered? No   Potential Assistance Purchasing Medications No   Type of Home Care Services None   Patient expects to be discharged to: House   One/Two Story Residence Two story   # of Interior Steps 12   Lift Chair Available No   History of falls? 1   Services At/After Discharge   Transition of Care Consult (CM Consult) N/A   Services At/After Discharge Skilled Nursing Facility (SNF)    Resource Information Provided? No   Mode of Transport at Discharge BLS   Confirm Follow Up Transport Self   Condition of Participation: Discharge Planning   The Patient and/or Patient Representative was provided with a Choice of Provider? Patient   The  Patient and/Or Patient Representative agree with the Discharge Plan? Yes   Freedom of Choice list was provided with basic dialogue that supports the patient's individualized plan of care/goals, treatment preferences, and shares the quality data associated with the providers?  Yes     Case Management Assessment  Initial Evaluation    Date/Time of Evaluation: 10/9/2024 12:44 PM  Assessment Completed by: Gini Palmer RN    If patient is discharged prior to next notation, then this note serves as note for discharge by case management.    Patient Name: Little Conrad                   YOB: 1950  Diagnosis: Hyponatremia [E87.1]  Closed fracture dislocation of right ankle, initial encounter [S82.891A]                   Date / Time: 10/6/2024  1:59 AM    Patient Admission Status: Inpatient   Readmission Risk (Low < 19, Mod (19-27), High > 27): Readmission Risk Score: 12.8    Current PCP: Fredis Smiley MD  PCP verified by CM? Yes    Chart Reviewed: Yes      History Provided by: Patient  Patient Orientation: Alert and Oriented    Patient Cognition: Alert    Hospitalization in the last 30 days (Readmission):  No    If yes, Readmission Assessment in CM Navigator will be completed.    Advance Directives:      Code Status: Full Code   Patient's Primary Decision Maker is: Legal Next of Kin      Discharge Planning:    Patient lives with: (P) Spouse/Significant Other Type of Home: (P) House  Primary Care Giver: Self  Patient Support Systems include: Spouse/Significant Other, Family Members   Current Financial resources: (P) Medicare  Current community resources: (P) None  Current services prior to admission: (P) Durable Medical Equipment            Current DME: (P) Cane, Walker, Wheelchair, Shower Chair (Patient's spouse got a walker, wheelchair and shower chair for home)            Type of Home Care services:  (P) None    ADLS  Prior functional level: (P) Independent in ADLs/IADLs  Current functional level:

## 2024-10-09 NOTE — PLAN OF CARE
Problem: Safety - Adult  Goal: Free from fall injury  10/9/2024 0955 by Alexandra Reynoso RN  Outcome: Progressing  Note: Fall precautions in place. Bed locked and in lowest position. Alarm on. Call light within reach.   10/9/2024 0353 by Libra Toribio RN  Outcome: Progressing     Problem: Discharge Planning  Goal: Discharge to home or other facility with appropriate resources  10/9/2024 0955 by Alexandra Reynoso RN  Outcome: Progressing  10/9/2024 0353 by Libra Toribio RN  Outcome: Progressing  Flowsheets (Taken 10/8/2024 2030)  Discharge to home or other facility with appropriate resources:   Identify barriers to discharge with patient and caregiver   Arrange for needed discharge resources and transportation as appropriate   Identify discharge learning needs (meds, wound care, etc)   Refer to discharge planning if patient needs post-hospital services based on physician order or complex needs related to functional status, cognitive ability or social support system     Problem: Pain  Goal: Verbalizes/displays adequate comfort level or baseline comfort level  10/9/2024 0955 by Alexandra Reynoso RN  Outcome: Progressing  10/9/2024 0353 by Libra Toribio RN  Outcome: Progressing  Flowsheets (Taken 10/8/2024 4836)  Verbalizes/displays adequate comfort level or baseline comfort level:   Encourage patient to monitor pain and request assistance   Assess pain using appropriate pain scale   Administer analgesics based on type and severity of pain and evaluate response   Implement non-pharmacological measures as appropriate and evaluate response   Notify Licensed Independent Practitioner if interventions unsuccessful or patient reports new pain     Problem: Skin/Tissue Integrity  Goal: Absence of new skin breakdown  Description: 1.  Monitor for areas of redness and/or skin breakdown  2.  Assess vascular access sites hourly  3.  Every 4-6 hours minimum:  Change oxygen saturation probe site  4.  Every 4-6 hours:  If on nasal continuous

## 2024-10-09 NOTE — PROGRESS NOTES
Lower Extremity Weight Bearing: Non Weight Bearing  Position Activity Restriction  Other position/activity restrictions: bassett catheter    Subjective  General  Chart Reviewed: Yes  Patient assessed for rehabilitation services?: Yes  Additional Pertinent Hx: Pt is a 73 yo female admitted after a fall at home and presents with R ankle fx. Pt underwent R ORIF on 10/8/24. NWB R LE. PMH includes hypertension, tardive dyskinesia, panic attacks, hyponatremia.  Family / Caregiver Present: No  Referring Practitioner: Brandee Christianson MD  Diagnosis: fall; R ORIF  Subjective  Subjective: Pt agreeable to OT treatment. Pt reports mild pain in R LE.     Social/Functional History  Social/Functional History  Lives With: Spouse  Type of Home: House  Home Layout: Multi-level  Home Access: Level entry  Bathroom Shower/Tub: Walk-in shower  Bathroom Toilet: Standard  Bathroom Equipment: Toilet raiser  Home Equipment: Cane, Walker - Rolling  Has the patient had two or more falls in the past year or any fall with injury in the past year?: Yes  ADL Assistance: Independent  Ambulation Assistance: Independent (Using SPC d/t increased weakness)  Transfer Assistance: Independent  Active : Yes    Objective       Safety Devices  Type of Devices: All fall risk precautions in place;Call light within reach;Nurse notified;Patient at risk for falls;Left in chair;Gait belt;Chair alarm in place  Restraints  Restraints Initially in Place: No  Bed Mobility Training  Bed Mobility Training: Yes  Overall Level of Assistance: Supervision  Supine to Sit: Supervision  Scooting: Supervision  Balance  Sitting: Intact  Standing: Impaired (RW; cues to keep R LE NWB)  Standing - Static: Fair  Transfer Training  Transfer Training: Yes  Overall Level of Assistance: Minimum assistance (RW; cues for hand placement)  Sit to Stand: Minimum assistance (RW; completed x3 from EOB to RW; Max difficulty pivoting with RW in stance)  Stand to Sit: Minimum assistance  Stand  grooming?: A Little  How much help for eating meals?: None  AM-PAC Inpatient Daily Activity Raw Score: 17  AM-PAC Inpatient ADL T-Scale Score : 37.26  ADL Inpatient CMS 0-100% Score: 50.11  ADL Inpatient CMS G-Code Modifier : CK    Tinneti Score       Goals  Short Term Goals  Time Frame for Short Term Goals: prior to d/c; ongoing 10/9  Short Term Goal 1: SBA fxl ADL pivot tx  Short Term Goal 2: SBA toileting  Short Term Goal 3: SBA LB bathing/dressing  Short Term Goal 4: SBA seated UB bathing/dressing  Short Term Goal 5: Pt will complete BUE exercises in all planes in prep to complete ADL task with independence  Long Term Goals  Time Frame for Long Term Goals : STGs=LTGs  Patient Goals   Patient goals : to get stronger      Therapy Time   Individual Concurrent Group Co-treatment   Time In 0731         Time Out 0831         Minutes 60         Timed Code Treatment Minutes: 60 Minutes       Pool Barrera OTR/L

## 2024-10-09 NOTE — PROGRESS NOTES
Name:  Little Conrad /Age/Sex: 1950  (74 y.o. female)   MRN & CSN:  [id number] & [id number] Encounter Date/Time: 10/9/2024 1:09 PM EDT   Location:  [id number] PCP: Fredis Smiley MD     Attending:Brandee Christianson MD       Little Conrad is a 74 y.o. female with  has a past medical history of Acid reflux, Depression, GERD (gastroesophageal reflux disease), Hypertension, IBS (irritable bowel syndrome), Insomnia, Panic attacks, and Tardive dyskinesia. who presents with Hyponatremia    Hospital Day: 4      Interval history:     Seen and examined at bedside. Pain better controlled today. No acute events overnight    Review of Systems:      10 point ROS negative except stated above    Objective:     Intake/Output Summary (Last 24 hours) at 10/9/2024 1332  Last data filed at 10/9/2024 1259  Gross per 24 hour   Intake 1220 ml   Output 3500 ml   Net -2280 ml      Vitals:   Vitals:    10/09/24 0718 10/09/24 1006 10/09/24 1140 10/09/24 1215   BP: (!) 173/91  137/81    Pulse: 90 77 80    Resp: 18 12 14 16   Temp: 98 °F (36.7 °C)  98.4 °F (36.9 °C)    TempSrc: Oral  Oral    SpO2: 95% 96% 96%    Weight:       Height:             Physical Exam:        General: NAD  Eyes: EOMI  ENT: neck supple  Cardiovascular: Regular rate.  Respiratory: Clear to auscultation  Gastrointestinal: Soft, non tender  Genitourinary: no suprapubic tenderness  Musculoskeletal: Lower extremities wrapped  Neuro: Alert.  Skin : RLE cast  Psych: Mood appropriate.       Medications:   Medications:    sodium chloride  1 g Oral TID WC    sodium chloride flush  5-40 mL IntraVENous 2 times per day    sodium chloride flush  5-40 mL IntraVENous 2 times per day    enoxaparin  40 mg SubCUTAneous Daily    busPIRone  10 mg Oral BID    pantoprazole  40 mg Oral QAM AC    Valbenazine Tosylate  40 mg Oral Daily    metoprolol tartrate  25 mg Oral BID    gabapentin  300 mg Oral Every Other Day    trospium  20 mg Oral Nightly      Infusions:    sodium  fracture. Avulsion fracture fragments along the posterior lip of the tibia, anterior lip of the tibia and along the medial malleolus.  Consider MRI to further characterize the soft tissue injury as appropriate.       CBC:   Recent Labs     10/07/24  0405 10/08/24  1041 10/09/24  0446   WBC 5.7 8.7 6.3   HGB 11.5* 11.9* 11.2*    202 187     BMP:    Recent Labs     10/07/24  0405 10/07/24  1047 10/08/24  1041 10/08/24  1750 10/08/24  2250 10/09/24  0446 10/09/24  1030   *   < > 129*   < > 134* 137  138 135*   K 3.9  --  3.8  --   --  4.1  --    CL 91*  --  93*  --   --  100  --    CO2 24  --  24  --   --  27  --    BUN 7  --  9  --   --  12  --    CREATININE <0.5*  --  0.6  --   --  0.6  --    GLUCOSE 104*  --  131*  --   --  105*  --     < > = values in this interval not displayed.     Hepatic:   No results for input(s): \"AST\", \"ALT\", \"BILITOT\", \"ALKPHOS\" in the last 72 hours.    Invalid input(s): \"ALB\"    Lipids: No results found for: \"CHOL\", \"HDL\", \"TRIG\"  Hemoglobin A1C:   Lab Results   Component Value Date/Time    LABA1C 5.5 10/06/2024 07:06 AM     TSH:   Lab Results   Component Value Date/Time    TSH 2.59 10/06/2024 07:06 AM     Troponin: No results found for: \"TROPONINT\"  Lactic Acid: No results for input(s): \"LACTA\" in the last 72 hours.  BNP:   No results for input(s): \"PROBNP\" in the last 72 hours.    UA:  Lab Results   Component Value Date/Time    NITRU Negative 10/06/2024 04:00 PM    COLORU Yellow 10/06/2024 04:00 PM    PHUR 5.5 10/06/2024 04:00 PM    WBCUA 5 10/06/2024 04:00 PM    RBCUA 2 10/06/2024 04:00 PM    BACTERIA 1+ 10/06/2024 04:00 PM    CLARITYU Clear 10/06/2024 04:00 PM    LEUKOCYTESUR TRACE 10/06/2024 04:00 PM    UROBILINOGEN 1.0 10/06/2024 04:00 PM    BILIRUBINUR Negative 10/06/2024 04:00 PM    BLOODU Negative 10/06/2024 04:00 PM    GLUCOSEU Negative 10/06/2024 04:00 PM    KETUA Negative 10/06/2024 04:00 PM     Urine Cultures: No results found for: \"LABURIN\"  Blood Cultures: No

## 2024-10-09 NOTE — PLAN OF CARE
Problem: Safety - Adult  Goal: Free from fall injury  Outcome: Progressing     Problem: Discharge Planning  Goal: Discharge to home or other facility with appropriate resources  Outcome: Progressing  Flowsheets (Taken 10/8/2024 2030)  Discharge to home or other facility with appropriate resources:   Identify barriers to discharge with patient and caregiver   Arrange for needed discharge resources and transportation as appropriate   Identify discharge learning needs (meds, wound care, etc)   Refer to discharge planning if patient needs post-hospital services based on physician order or complex needs related to functional status, cognitive ability or social support system     Problem: Pain  Goal: Verbalizes/displays adequate comfort level or baseline comfort level  Outcome: Progressing  Flowsheets (Taken 10/8/2024 2356)  Verbalizes/displays adequate comfort level or baseline comfort level:   Encourage patient to monitor pain and request assistance   Assess pain using appropriate pain scale   Administer analgesics based on type and severity of pain and evaluate response   Implement non-pharmacological measures as appropriate and evaluate response   Notify Licensed Independent Practitioner if interventions unsuccessful or patient reports new pain     Problem: Skin/Tissue Integrity  Goal: Absence of new skin breakdown  Description: 1.  Monitor for areas of redness and/or skin breakdown  2.  Assess vascular access sites hourly  3.  Every 4-6 hours minimum:  Change oxygen saturation probe site  4.  Every 4-6 hours:  If on nasal continuous positive airway pressure, respiratory therapy assess nares and determine need for appliance change or resting period.  Outcome: Progressing     Problem: Neurosensory - Adult  Goal: Achieves stable or improved neurological status  Outcome: Progressing     Problem: Skin/Tissue Integrity - Adult  Goal: Skin integrity remains intact  Outcome: Progressing  Flowsheets (Taken 10/8/2024

## 2024-10-09 NOTE — PROGRESS NOTES
[facility] Orthopedic Surgery  Progress Note        POD # 2, s/p ORIF right ankle fracture dislocation.    Pt comfortable, no c/o.  Splint right ankle D/C/I,  Minimal pain with right toes ROM, NVI    CBC:   Lab Results   Component Value Date/Time    WBC 6.3 10/09/2024 04:46 AM    RBC 3.22 10/09/2024 04:46 AM    HGB 11.2 10/09/2024 04:46 AM    HCT 32.0 10/09/2024 04:46 AM    MCV 99.5 10/09/2024 04:46 AM    MCH 34.9 10/09/2024 04:46 AM    MCHC 35.0 10/09/2024 04:46 AM    RDW 13.4 10/09/2024 04:46 AM     10/09/2024 04:46 AM    MPV 7.2 10/09/2024 04:46 AM     PT/INR:  No results found for: \"PROTIME\", \"INR\"  PTT:  No results found for: \"APTT\"[APTT    A/P: s/p ORIF right ankle fracture dislocation.  - Stable  - PT/OT, NWB right ankle for 6 weeks  - Ok to D/C to ECF from ortho standpoint.  - F/U Dr Landis in 2 weeks.      Delisa Landis MD 10/9/2024 6:48 AM

## 2024-10-09 NOTE — PLAN OF CARE
Problem: Safety - Adult  Goal: Free from fall injury  10/9/2024 1940 by Kylie Billingsley RN  Outcome: Progressing  10/9/2024 0955 by Alexandra Reynoso RN  Outcome: Progressing  Note: Fall precautions in place. Bed locked and in lowest position. Alarm on. Call light within reach.      Problem: Discharge Planning  Goal: Discharge to home or other facility with appropriate resources  10/9/2024 1940 by Kylie Billingsley RN  Outcome: Progressing  10/9/2024 0955 by Alexandra Reynoso RN  Outcome: Progressing     Problem: Pain  Goal: Verbalizes/displays adequate comfort level or baseline comfort level  10/9/2024 1940 by Kylie Billingsley RN  Outcome: Progressing  10/9/2024 0955 by Alexandra Reynoso RN  Outcome: Progressing     Problem: Skin/Tissue Integrity  Goal: Absence of new skin breakdown  Description: 1.  Monitor for areas of redness and/or skin breakdown  2.  Assess vascular access sites hourly  3.  Every 4-6 hours minimum:  Change oxygen saturation probe site  4.  Every 4-6 hours:  If on nasal continuous positive airway pressure, respiratory therapy assess nares and determine need for appliance change or resting period.  10/9/2024 1940 by Kylie Billingsley RN  Outcome: Progressing  10/9/2024 0955 by Alexandra Reynoso RN  Outcome: Progressing     Problem: Neurosensory - Adult  Goal: Achieves stable or improved neurological status  10/9/2024 1940 by Kylie Billingsley RN  Outcome: Progressing  10/9/2024 0955 by Alexandra Reynoso RN  Outcome: Progressing     Problem: Skin/Tissue Integrity - Adult  Goal: Skin integrity remains intact  10/9/2024 1940 by Kylie Billingsley RN  Outcome: Progressing  Flowsheets (Taken 10/9/2024 0957 by Alexandra Reynoso RN)  Skin Integrity Remains Intact: Monitor for areas of redness and/or skin breakdown  10/9/2024 0955 by Alexandra Reynoso RN  Outcome: Progressing  Goal: Incisions, wounds, or drain sites healing without S/S of infection  10/9/2024 1940 by Kylie Billingsley RN  Outcome: Progressing  10/9/2024 0955 by Angeles  SUE Morris  Outcome: Progressing     Problem: Musculoskeletal - Adult  Goal: Return mobility to safest level of function  10/9/2024 1940 by Kylie Billingsley RN  Outcome: Progressing  10/9/2024 0955 by Alexandra Ryenoso RN  Outcome: Progressing  Goal: Maintain proper alignment of affected body part  10/9/2024 1940 by Kylie Billingsley RN  Outcome: Progressing  10/9/2024 0955 by Alexandra Reynoso RN  Outcome: Progressing  Goal: Return ADL status to a safe level of function  10/9/2024 1940 by Kylie Billingsley RN  Outcome: Progressing  10/9/2024 0955 by Alexandra Reynoso RN  Outcome: Progressing     Problem: Gastrointestinal - Adult  Goal: Maintains or returns to baseline bowel function  10/9/2024 1940 by Kylie Billingsley RN  Outcome: Progressing  10/9/2024 0955 by Alexandra Reynoso RN  Outcome: Progressing     Problem: Genitourinary - Adult  Goal: Absence of urinary retention  10/9/2024 1940 by Kylie Billingsley RN  Outcome: Progressing  10/9/2024 0955 by Alexandra Reynoso RN  Outcome: Progressing  Goal: Urinary catheter remains patent  10/9/2024 1940 by Kylie Billingsley RN  Outcome: Progressing  10/9/2024 0955 by Alexandra Reynoso RN  Outcome: Progressing     Problem: Infection - Adult  Goal: Absence of infection at discharge  10/9/2024 1940 by Kylie Billingsley RN  Outcome: Progressing  10/9/2024 0955 by Alexandra Reynoso RN  Outcome: Progressing  Goal: Absence of infection during hospitalization  10/9/2024 1940 by Kylie Billingsley RN  Outcome: Progressing  10/9/2024 0955 by Alexandra Reynoso RN  Outcome: Progressing     Problem: Metabolic/Fluid and Electrolytes - Adult  Goal: Electrolytes maintained within normal limits  10/9/2024 1940 by Kylie Billingsley RN  Outcome: Progressing  10/9/2024 0955 by Alexandra Reynoso RN  Outcome: Progressing

## 2024-10-09 NOTE — PROGRESS NOTES
Pt up to chair for several hours this shift.   States pain is much improved this shift. Dressing to RLE remains dry and intact. Toes warm, + sensation.   Pt educated multiple times on fluid restriction but appears to have difficulty retaining some new info.    at bedside for short period of time- updated on POC.  Discharge pending placement.  Electronically signed by Alexandra Reynoso RN on 10/9/24 at 5:37 PM EDT

## 2024-10-09 NOTE — PROGRESS NOTES
Physical Therapy  Facility/Department: [address] PROGRESSIVE CARE  Physical Therapy Treatment Note    Name: Little Conrad  : 1950  MRN: [id number]  Date of Service: 10/9/2024    Discharge Recommendations:  Continue to assess pending progress (SNF vs In-Pt Rehab)   PT Equipment Recommendations  Mobility Devices: Walker;Wheelchair  Walker: Rolling  Other: Walker, W/C with R LE Elevating Legrest.      Little Conrad scored a 14/24 on the AM-PAC short mobility form. Current research shows that an AM-PAC score of 17 or less is typically not associated with a discharge to the patient's home setting. Based on the patient's AM-PAC score and their current functional mobility deficits, it is recommended that the patient have 3-5 vs 5-7 sessions per week of Physical Therapy at d/c to increase the patient's independence.  Please see assessment section for further patient specific details.    If patient discharges prior to next session this note will serve as a discharge summary.  Please see below for the latest assessment towards goals.      Assessment  Body Structures, Functions, Activity Limitations Requiring Skilled Therapeutic Intervention: Decreased functional mobility ;Decreased strength;Decreased safe awareness;Decreased endurance;Decreased balance (NWB R LE.)  Assessment: 73 y/o female admit 10/6/2024 with R Ankle Fx/Dislocation, Hyponatremia. Closed Reduction/Splint in ED. Admit, Ortho consult. 10/7/2024 S/P R ORIF. PMH as noted including Neck Surg, Shld (RTC, R) Surg, L Foot Surg, IBS, HTN, Tardive Dyskinesthia, Panic Attacks.  PTA pt living at home with  in bilevel home with level entry garage into lower level (1/2 bath on lower level) and 6+6 steps to access main level with bed/bath.  Reports independent daily care and amb with cane.  Currently pt able to transfer, take few steps/hops recliner to/from bedside chair (x3 reps) CGA/Min assist although unsteady/ongoing cues for safe transitional

## 2024-10-09 NOTE — PROGRESS NOTES
Nephrology Progress Note   [facility]      Reason for Consultation: Hyponatremia    History of Present Illness: Ms Conrad is a 74 female with past medical history significant for hypertension, depression/anxiety, pain, tardive dyskinesia, came to ER after having a fall. The patient was diagnosed with right ankle fracture. Nephrology consulted for Hyponatremia    Subjective:    In recliner; NAD    Scheduled Meds:   sodium chloride  1 g Oral TID WC    sodium chloride flush  5-40 mL IntraVENous 2 times per day    sodium chloride flush  5-40 mL IntraVENous 2 times per day    enoxaparin  40 mg SubCUTAneous Daily    busPIRone  10 mg Oral BID    pantoprazole  40 mg Oral QAM AC    Valbenazine Tosylate  40 mg Oral Daily    metoprolol tartrate  25 mg Oral BID    gabapentin  300 mg Oral Every Other Day    trospium  20 mg Oral Nightly        sodium chloride      sodium chloride         PRN Meds:.sodium chloride flush, sodium chloride, prochlorperazine, sodium chloride flush, sodium chloride, potassium chloride **OR** potassium alternative oral replacement **OR** potassium chloride, magnesium sulfate, ondansetron **OR** ondansetron, polyethylene glycol, acetaminophen **OR** acetaminophen, melatonin, ketorolac, zolpidem, morphine    Physical Exam:    TEMPERATURE:  Current - Temp: 98 °F (36.7 °C); Max - Temp  Av.1 °F (36.7 °C)  Min: 97.9 °F (36.6 °C)  Max: 98.3 °F (36.8 °C)  RESPIRATIONS RANGE: Resp  Av.5  Min: 12  Max: 20  PULSE RANGE: Pulse  Av.3  Min: 77  Max: 103  BLOOD PRESSURE RANGE:  Systolic (24hrs), Av , Min:147 , Max:173   ; Diastolic (24hrs), Av, Min:75, Max:91    24HR INTAKE/OUTPUT:    Intake/Output Summary (Last 24 hours) at 10/9/2024 1109  Last data filed at 10/9/2024 1000  Gross per 24 hour   Intake 1385.58 ml   Output 3500 ml   Net -2114.42 ml       Patient Vitals for the past 96 hrs (Last 3 readings):   Weight   10/09/24 0545 71.6 kg (157 lb 13.6 oz)   10/08/24 0359 77.1 kg (169 lb  UROBILINOGEN 1.0 10/06/2024 04:00 PM    BILIRUBINUR Negative 10/06/2024 04:00 PM    BLOODU Negative 10/06/2024 04:00 PM    GLUCOSEU Negative 10/06/2024 04:00 PM         IMPRESSION/RECOMMENDATIONS:      Principal Problem:    Hyponatremia  Active Problems:    Closed fracture dislocation of right ankle joint    Essential hypertension    Syndesmotic disruption of right ankle  Resolved Problems:    * No resolved hospital problems. *    Hyponatremia - sodium trending up at desired range - corrected  - Received [facility]a x 1 on 10/7/24  - continue sodium chloride tablets which were started on 10/8/24  - continue FR 1.5 L / day    2. right ankle fracture dislocation on 10/6/24. S/p ORIF 10/7/24    Nephrology will sign off; instructed patient to maintain FR on discharge and incorporate protein in her diet on a daily basis

## 2024-10-10 LAB
ALBUMIN SERPL-MCNC: 3.8 G/DL (ref 3.4–5)
ANION GAP SERPL CALCULATED.3IONS-SCNC: 9 MMOL/L (ref 3–16)
BASOPHILS # BLD: 0 K/UL (ref 0–0.2)
BASOPHILS NFR BLD: 0.7 %
BUN SERPL-MCNC: 15 MG/DL (ref 7–20)
CALCIUM SERPL-MCNC: 8.5 MG/DL (ref 8.3–10.6)
CHLORIDE SERPL-SCNC: 100 MMOL/L (ref 99–110)
CO2 SERPL-SCNC: 27 MMOL/L (ref 21–32)
CREAT SERPL-MCNC: 0.5 MG/DL (ref 0.6–1.2)
DEPRECATED RDW RBC AUTO: 13 % (ref 12.4–15.4)
EOSINOPHIL # BLD: 0.1 K/UL (ref 0–0.6)
EOSINOPHIL NFR BLD: 2.4 %
GFR SERPLBLD CREATININE-BSD FMLA CKD-EPI: >90 ML/MIN/{1.73_M2}
GLUCOSE SERPL-MCNC: 114 MG/DL (ref 70–99)
HCT VFR BLD AUTO: 31.4 % (ref 36–48)
HGB BLD-MCNC: 11 G/DL (ref 12–16)
LYMPHOCYTES # BLD: 1.5 K/UL (ref 1–5.1)
LYMPHOCYTES NFR BLD: 28.2 %
MAGNESIUM SERPL-MCNC: 1.99 MG/DL (ref 1.8–2.4)
MCH RBC QN AUTO: 34.9 PG (ref 26–34)
MCHC RBC AUTO-ENTMCNC: 35.1 G/DL (ref 31–36)
MCV RBC AUTO: 99.6 FL (ref 80–100)
MONOCYTES # BLD: 0.8 K/UL (ref 0–1.3)
MONOCYTES NFR BLD: 14.6 %
NEUTROPHILS # BLD: 2.8 K/UL (ref 1.7–7.7)
NEUTROPHILS NFR BLD: 54.1 %
PHOSPHATE SERPL-MCNC: 4.6 MG/DL (ref 2.5–4.9)
PLATELET # BLD AUTO: 197 K/UL (ref 135–450)
PMV BLD AUTO: 7.2 FL (ref 5–10.5)
POTASSIUM SERPL-SCNC: 4.4 MMOL/L (ref 3.5–5.1)
RBC # BLD AUTO: 3.15 M/UL (ref 4–5.2)
SODIUM SERPL-SCNC: 136 MMOL/L (ref 136–145)
WBC # BLD AUTO: 5.1 K/UL (ref 4–11)

## 2024-10-10 PROCEDURE — 6370000000 HC RX 637 (ALT 250 FOR IP): Performed by: INTERNAL MEDICINE

## 2024-10-10 PROCEDURE — 2580000003 HC RX 258: Performed by: ANESTHESIOLOGY

## 2024-10-10 PROCEDURE — 36415 COLL VENOUS BLD VENIPUNCTURE: CPT

## 2024-10-10 PROCEDURE — 94760 N-INVAS EAR/PLS OXIMETRY 1: CPT

## 2024-10-10 PROCEDURE — 6370000000 HC RX 637 (ALT 250 FOR IP)

## 2024-10-10 PROCEDURE — 85025 COMPLETE CBC W/AUTO DIFF WBC: CPT

## 2024-10-10 PROCEDURE — 97116 GAIT TRAINING THERAPY: CPT

## 2024-10-10 PROCEDURE — 6360000002 HC RX W HCPCS: Performed by: INTERNAL MEDICINE

## 2024-10-10 PROCEDURE — 83735 ASSAY OF MAGNESIUM: CPT

## 2024-10-10 PROCEDURE — 80069 RENAL FUNCTION PANEL: CPT

## 2024-10-10 PROCEDURE — 97530 THERAPEUTIC ACTIVITIES: CPT

## 2024-10-10 PROCEDURE — 97110 THERAPEUTIC EXERCISES: CPT

## 2024-10-10 PROCEDURE — 2060000000 HC ICU INTERMEDIATE R&B

## 2024-10-10 PROCEDURE — 2580000003 HC RX 258: Performed by: INTERNAL MEDICINE

## 2024-10-10 RX ADMIN — HYDROCODONE BITARTRATE AND ACETAMINOPHEN 1 TABLET: 5; 325 TABLET ORAL at 11:22

## 2024-10-10 RX ADMIN — ACETAMINOPHEN 325MG 650 MG: 325 TABLET ORAL at 09:58

## 2024-10-10 RX ADMIN — Medication 1 G: at 11:56

## 2024-10-10 RX ADMIN — BUSPIRONE HYDROCHLORIDE 10 MG: 10 TABLET ORAL at 09:28

## 2024-10-10 RX ADMIN — Medication 1 G: at 17:39

## 2024-10-10 RX ADMIN — KETOROLAC TROMETHAMINE 15 MG: 15 INJECTION, SOLUTION INTRAMUSCULAR; INTRAVENOUS at 14:05

## 2024-10-10 RX ADMIN — POLYETHYLENE GLYCOL 3350 17 G: 17 POWDER, FOR SOLUTION ORAL at 09:39

## 2024-10-10 RX ADMIN — METOPROLOL TARTRATE 25 MG: 25 TABLET, FILM COATED ORAL at 09:32

## 2024-10-10 RX ADMIN — BUSPIRONE HYDROCHLORIDE 10 MG: 10 TABLET ORAL at 20:24

## 2024-10-10 RX ADMIN — Medication 1 G: at 09:28

## 2024-10-10 RX ADMIN — ZOLPIDEM TARTRATE 10 MG: 5 TABLET, COATED ORAL at 22:05

## 2024-10-10 RX ADMIN — ENOXAPARIN SODIUM 40 MG: 100 INJECTION SUBCUTANEOUS at 09:28

## 2024-10-10 RX ADMIN — METOPROLOL TARTRATE 25 MG: 25 TABLET, FILM COATED ORAL at 20:24

## 2024-10-10 RX ADMIN — HYDROCODONE BITARTRATE AND ACETAMINOPHEN 1 TABLET: 5; 325 TABLET ORAL at 18:38

## 2024-10-10 RX ADMIN — SODIUM CHLORIDE, PRESERVATIVE FREE 10 ML: 5 INJECTION INTRAVENOUS at 09:31

## 2024-10-10 RX ADMIN — HYDROCODONE BITARTRATE AND ACETAMINOPHEN 1 TABLET: 5; 325 TABLET ORAL at 02:13

## 2024-10-10 RX ADMIN — TROSPIUM CHLORIDE 20 MG: 20 TABLET, FILM COATED ORAL at 20:24

## 2024-10-10 RX ADMIN — PANTOPRAZOLE SODIUM 40 MG: 40 TABLET, DELAYED RELEASE ORAL at 05:57

## 2024-10-10 RX ADMIN — SODIUM CHLORIDE, PRESERVATIVE FREE 10 ML: 5 INJECTION INTRAVENOUS at 22:05

## 2024-10-10 RX ADMIN — SODIUM CHLORIDE, PRESERVATIVE FREE 10 ML: 5 INJECTION INTRAVENOUS at 09:30

## 2024-10-10 ASSESSMENT — PAIN DESCRIPTION - DESCRIPTORS
DESCRIPTORS: SHARP;ACHING
DESCRIPTORS: STABBING;ACHING
DESCRIPTORS: STABBING
DESCRIPTORS: STABBING

## 2024-10-10 ASSESSMENT — PAIN DESCRIPTION - ORIENTATION
ORIENTATION: RIGHT

## 2024-10-10 ASSESSMENT — PAIN SCALES - GENERAL
PAINLEVEL_OUTOF10: 4
PAINLEVEL_OUTOF10: 7
PAINLEVEL_OUTOF10: 7
PAINLEVEL_OUTOF10: 4
PAINLEVEL_OUTOF10: 2
PAINLEVEL_OUTOF10: 5
PAINLEVEL_OUTOF10: 8
PAINLEVEL_OUTOF10: 4

## 2024-10-10 ASSESSMENT — PAIN - FUNCTIONAL ASSESSMENT
PAIN_FUNCTIONAL_ASSESSMENT: PREVENTS OR INTERFERES SOME ACTIVE ACTIVITIES AND ADLS

## 2024-10-10 ASSESSMENT — PAIN DESCRIPTION - LOCATION
LOCATION: GENERALIZED
LOCATION: ANKLE
LOCATION: ANKLE;KNEE
LOCATION: KNEE;ANKLE
LOCATION: ANKLE;KNEE

## 2024-10-10 ASSESSMENT — PAIN SCALES - WONG BAKER
WONGBAKER_NUMERICALRESPONSE: HURTS LITTLE MORE
WONGBAKER_NUMERICALRESPONSE: HURTS EVEN MORE
WONGBAKER_NUMERICALRESPONSE: HURTS LITTLE MORE

## 2024-10-10 NOTE — PROGRESS NOTES
Occupational Therapy  Facility/Department: [address] PROGRESSIVE CARE  Occupational Therapy Daily Treatment    Name: Little Conrad  : 1950  MRN: [id number]  Date of Service: 10/10/2024    Discharge Recommendations:  Patient would benefit from continued therapy after discharge, 3-5 sessions per week  Little Conrad scored a 17/24 on the AM-PAC ADL Inpatient form. Current research shows that an AM-PAC score of 17 or less is typically not associated with a discharge to the patient's home setting. Based on the patient's AM-PAC score and their current ADL deficits, it is recommended that the patient have 3-5 sessions per week of Occupational Therapy at d/c to increase the patient's independence.  Please see assessment section for further patient specific details.    If patient discharges prior to next session this note will serve as a discharge summary.  Please see below for the latest assessment towards goals.          Patient Diagnosis(es): The primary encounter diagnosis was Closed fracture dislocation of right ankle, initial encounter. A diagnosis of Hyponatremia was also pertinent to this visit.  Past Medical History:  has a past medical history of Acid reflux, Depression, GERD (gastroesophageal reflux disease), Hypertension, IBS (irritable bowel syndrome), Insomnia, Panic attacks, and Tardive dyskinesia.  Past Surgical History:  has a past surgical history that includes Colonoscopy; Hysterectomy; Breast surgery; back surgery; shoulder surgery (Right); Foot surgery (Left); Colonoscopy (N/A, 10/18/2023); Upper gastrointestinal endoscopy (N/A, 10/18/2023); and Ankle fracture surgery (Right, 10/7/2024).    Treatment Diagnosis: decreased fxl mobility/ADLs/transfers      Assessment  Performance deficits / Impairments: Decreased functional mobility ;Decreased balance;Decreased ADL status;Decreased strength  Assessment: Pt w/ good tolerance to session this date. She completed bed mob w/ SBA (HOB fully

## 2024-10-10 NOTE — PROGRESS NOTES
Physical Therapy  Facility/Department: [address] PROGRESSIVE CARE  Physical Therapy Treatment Note    Name: Little Conrad  : 1950  MRN: [id number]  Date of Service: 10/10/2024    Discharge Recommendations:  Continue to assess pending progress, Subacute/Skilled Nursing Facility   PT Equipment Recommendations  Other: Defer to next level of chair.      Little Conrad scored a 15/24 on the AM-PAC short mobility form. Current research shows that an AM-PAC score of 17 or less is typically not associated with a discharge to the patient's home setting. Based on the patient's AM-PAC score and their current functional mobility deficits, it is recommended that the patient have 3-5 sessions per week of Physical Therapy at d/c to increase the patient's independence.  Please see assessment section for further patient specific details.    If patient discharges prior to next session this note will serve as a discharge summary.  Please see below for the latest assessment towards goals.      Assessment  Body Structures, Functions, Activity Limitations Requiring Skilled Therapeutic Intervention: Decreased functional mobility ;Decreased strength;Decreased safe awareness;Decreased endurance;Decreased balance (NWB R LE.)  Assessment: 73 y/o female admit 10/6/2024 with R Ankle Fx/Dislocation, Hyponatremia. Closed Reduction/Splint in ED. Admit, Ortho consult. 10/7/2024 S/P R ORIF. PMH as noted including Neck Surg, Shld (RTC, R) Surg, L Foot Surg, IBS, HTN, Tardive Dyskinesthia, Panic Attacks.  PTA pt living at home with  in bilevel home with level entry garage into lower level (1/2 bath on lower level) and 6+6 steps to access main level with bed/bath.  Reports independent daily care and amb with cane.  Currently pt able to transfer, take few steps/hops recliner to/from bedside chair (x3 reps) CGA/Min assist and then recliner 6-7 small steps/hops recliner to/from eob (x3 reps) with Min assist although unsteady/ongoing

## 2024-10-10 NOTE — PLAN OF CARE
Problem: Safety - Adult  Goal: Free from fall injury  Outcome: Progressing     Problem: Discharge Planning  Goal: Discharge to home or other facility with appropriate resources  Outcome: Progressing     Problem: Pain  Goal: Verbalizes/displays adequate comfort level or baseline comfort level  Outcome: Progressing     Problem: Skin/Tissue Integrity  Goal: Absence of new skin breakdown  Description: 1.  Monitor for areas of redness and/or skin breakdown  2.  Assess vascular access sites hourly  3.  Every 4-6 hours minimum:  Change oxygen saturation probe site  4.  Every 4-6 hours:  If on nasal continuous positive airway pressure, respiratory therapy assess nares and determine need for appliance change or resting period.  Outcome: Progressing     Problem: Neurosensory - Adult  Goal: Achieves stable or improved neurological status  Outcome: Progressing     Problem: Skin/Tissue Integrity - Adult  Goal: Skin integrity remains intact  Outcome: Progressing  Goal: Incisions, wounds, or drain sites healing without S/S of infection  Outcome: Progressing     Problem: Musculoskeletal - Adult  Goal: Return mobility to safest level of function  Outcome: Progressing  Goal: Maintain proper alignment of affected body part  Outcome: Progressing  Goal: Return ADL status to a safe level of function  Outcome: Progressing     Problem: Gastrointestinal - Adult  Goal: Maintains or returns to baseline bowel function  Outcome: Progressing     Problem: Genitourinary - Adult  Goal: Absence of urinary retention  Outcome: Progressing  Goal: Urinary catheter remains patent  Outcome: Progressing     Problem: Infection - Adult  Goal: Absence of infection at discharge  Outcome: Progressing  Goal: Absence of infection during hospitalization  Outcome: Progressing     Problem: Metabolic/Fluid and Electrolytes - Adult  Goal: Electrolytes maintained within normal limits  Outcome: Progressing

## 2024-10-10 NOTE — PROGRESS NOTES
Name:  Little Conrad /Age/Sex: 1950  (74 y.o. female)   MRN & CSN:  [id number] & [id number] Encounter Date/Time: 10/10/2024 1:09 PM EDT   Location:  [id number] PCP: Fredis Smiley MD     Attending:Brandee Christianson MD       Little Conrad is a 74 y.o. female with  has a past medical history of Acid reflux, Depression, GERD (gastroesophageal reflux disease), Hypertension, IBS (irritable bowel syndrome), Insomnia, Panic attacks, and Tardive dyskinesia. who presents with Hyponatremia    Hospital Day: 5      Interval history:     Seen and examined at bedside. No acute events overnight. Pain better controlled.   Review of Systems:      10 point ROS negative except stated above    Objective:     Intake/Output Summary (Last 24 hours) at 10/10/2024 1329  Last data filed at 10/10/2024 1205  Gross per 24 hour   Intake 480 ml   Output 2175 ml   Net -1695 ml      Vitals:   Vitals:    10/10/24 0815 10/10/24 0932 10/10/24 1120 10/10/24 1152   BP:   (!) 162/81    Pulse:  93 83    Resp:   20 21   Temp: 97.6 °F (36.4 °C)  97.4 °F (36.3 °C)    TempSrc: Oral  Oral    SpO2:   95%    Weight:       Height:             Physical Exam:        General: NAD  Eyes: EOMI  ENT: neck supple  Cardiovascular: Regular rate.  Respiratory: Clear to auscultation  Gastrointestinal: Soft, non tender  Genitourinary: no suprapubic tenderness  Musculoskeletal: Lower extremities wrapped  Neuro: Alert.  Skin : RLE cast  Psych: Mood appropriate.       Medications:   Medications:    sodium chloride  1 g Oral TID WC    sodium chloride flush  5-40 mL IntraVENous 2 times per day    sodium chloride flush  5-40 mL IntraVENous 2 times per day    enoxaparin  40 mg SubCUTAneous Daily    busPIRone  10 mg Oral BID    pantoprazole  40 mg Oral QAM AC    Valbenazine Tosylate  40 mg Oral Daily    metoprolol tartrate  25 mg Oral BID    gabapentin  300 mg Oral Every Other Day    trospium  20 mg Oral Nightly      Infusions:    sodium chloride       fracture fragments along the posterior lip of the tibia, anterior lip of the tibia and along the medial malleolus.  Consider MRI to further characterize the soft tissue injury as appropriate.       CBC:   Recent Labs     10/08/24  1041 10/09/24  0446 10/10/24  0519   WBC 8.7 6.3 5.1   HGB 11.9* 11.2* 11.0*    187 197     BMP:    Recent Labs     10/08/24  1041 10/08/24  1750 10/09/24  0446 10/09/24  1030 10/10/24  0519   *   < > 137  138 135* 136   K 3.8  --  4.1  --  4.4   CL 93*  --  100  --  100   CO2 24  --  27  --  27   BUN 9  --  12  --  15   CREATININE 0.6  --  0.6  --  0.5*   GLUCOSE 131*  --  105*  --  114*    < > = values in this interval not displayed.     Hepatic:   No results for input(s): \"AST\", \"ALT\", \"BILITOT\", \"ALKPHOS\" in the last 72 hours.    Invalid input(s): \"ALB\"    Lipids: No results found for: \"CHOL\", \"HDL\", \"TRIG\"  Hemoglobin A1C:   Lab Results   Component Value Date/Time    LABA1C 5.5 10/06/2024 07:06 AM     TSH:   Lab Results   Component Value Date/Time    TSH 2.59 10/06/2024 07:06 AM     Troponin: No results found for: \"TROPONINT\"  Lactic Acid: No results for input(s): \"LACTA\" in the last 72 hours.  BNP:   No results for input(s): \"PROBNP\" in the last 72 hours.    UA:  Lab Results   Component Value Date/Time    NITRU Negative 10/06/2024 04:00 PM    COLORU Yellow 10/06/2024 04:00 PM    PHUR 5.5 10/06/2024 04:00 PM    WBCUA 5 10/06/2024 04:00 PM    RBCUA 2 10/06/2024 04:00 PM    BACTERIA 1+ 10/06/2024 04:00 PM    CLARITYU Clear 10/06/2024 04:00 PM    LEUKOCYTESUR TRACE 10/06/2024 04:00 PM    UROBILINOGEN 1.0 10/06/2024 04:00 PM    BILIRUBINUR Negative 10/06/2024 04:00 PM    BLOODU Negative 10/06/2024 04:00 PM    GLUCOSEU Negative 10/06/2024 04:00 PM    KETUA Negative 10/06/2024 04:00 PM     Urine Cultures: No results found for: \"LABURIN\"  Blood Cultures: No results found for: \"BC\"  No results found for: \"BLOODCULT2\"  Organism: No results found for: \"ORG\"      Assessment and

## 2024-10-10 NOTE — CARE COORDINATION
DISCHARGE PLANNING:    Spoke to patient and  at bedside.  Dc plan to [facility].  Precert request initiated.  Patient will need HENS and transport.      #[phone]  Electronically signed by Lenora Zhao RN on 10/10/2024 at 12:08 PM

## 2024-10-10 NOTE — CARE COORDINATION
[facility] - [employer] PRE-CERT REQUEST SUBMITTED VIA [employer] PORTAL    REQUESTED FOR FACILITY:  [facility]    ANTICIPATED ADMIT DATE TO [facility]:  10/10/2024      [facility]  [id number]     Electronically signed by Jess Belcher, Case Management Assistant Jess Belcher on 10/10/2024 at 2:20 PM

## 2024-10-11 VITALS
OXYGEN SATURATION: 99 % | DIASTOLIC BLOOD PRESSURE: 81 MMHG | RESPIRATION RATE: 17 BRPM | HEIGHT: 61 IN | BODY MASS INDEX: 29.8 KG/M2 | TEMPERATURE: 98.8 F | WEIGHT: 157.85 LBS | SYSTOLIC BLOOD PRESSURE: 146 MMHG | HEART RATE: 83 BPM

## 2024-10-11 LAB
ALBUMIN SERPL-MCNC: 3.6 G/DL (ref 3.4–5)
ANION GAP SERPL CALCULATED.3IONS-SCNC: 9 MMOL/L (ref 3–16)
BASOPHILS # BLD: 0 K/UL (ref 0–0.2)
BASOPHILS NFR BLD: 0.8 %
BUN SERPL-MCNC: 13 MG/DL (ref 7–20)
CALCIUM SERPL-MCNC: 9.3 MG/DL (ref 8.3–10.6)
CHLORIDE SERPL-SCNC: 102 MMOL/L (ref 99–110)
CO2 SERPL-SCNC: 26 MMOL/L (ref 21–32)
CREAT SERPL-MCNC: 0.5 MG/DL (ref 0.6–1.2)
DEPRECATED RDW RBC AUTO: 13 % (ref 12.4–15.4)
EOSINOPHIL # BLD: 0.1 K/UL (ref 0–0.6)
EOSINOPHIL NFR BLD: 2.5 %
GFR SERPLBLD CREATININE-BSD FMLA CKD-EPI: >90 ML/MIN/{1.73_M2}
GLUCOSE SERPL-MCNC: 87 MG/DL (ref 70–99)
HCT VFR BLD AUTO: 32.8 % (ref 36–48)
HGB BLD-MCNC: 11.3 G/DL (ref 12–16)
LYMPHOCYTES # BLD: 1.1 K/UL (ref 1–5.1)
LYMPHOCYTES NFR BLD: 24.4 %
MAGNESIUM SERPL-MCNC: 1.94 MG/DL (ref 1.8–2.4)
MCH RBC QN AUTO: 34.3 PG (ref 26–34)
MCHC RBC AUTO-ENTMCNC: 34.5 G/DL (ref 31–36)
MCV RBC AUTO: 99.4 FL (ref 80–100)
MONOCYTES # BLD: 0.6 K/UL (ref 0–1.3)
MONOCYTES NFR BLD: 13.9 %
NEUTROPHILS # BLD: 2.7 K/UL (ref 1.7–7.7)
NEUTROPHILS NFR BLD: 58.4 %
PHOSPHATE SERPL-MCNC: 3.5 MG/DL (ref 2.5–4.9)
PLATELET # BLD AUTO: 201 K/UL (ref 135–450)
PMV BLD AUTO: 7.2 FL (ref 5–10.5)
POTASSIUM SERPL-SCNC: 4.1 MMOL/L (ref 3.5–5.1)
RBC # BLD AUTO: 3.3 M/UL (ref 4–5.2)
SODIUM SERPL-SCNC: 137 MMOL/L (ref 136–145)
WBC # BLD AUTO: 4.6 K/UL (ref 4–11)

## 2024-10-11 PROCEDURE — 2580000003 HC RX 258: Performed by: ANESTHESIOLOGY

## 2024-10-11 PROCEDURE — 97535 SELF CARE MNGMENT TRAINING: CPT

## 2024-10-11 PROCEDURE — 6370000000 HC RX 637 (ALT 250 FOR IP)

## 2024-10-11 PROCEDURE — 6370000000 HC RX 637 (ALT 250 FOR IP): Performed by: INTERNAL MEDICINE

## 2024-10-11 PROCEDURE — 97530 THERAPEUTIC ACTIVITIES: CPT

## 2024-10-11 PROCEDURE — 36415 COLL VENOUS BLD VENIPUNCTURE: CPT

## 2024-10-11 PROCEDURE — 83735 ASSAY OF MAGNESIUM: CPT

## 2024-10-11 PROCEDURE — 85025 COMPLETE CBC W/AUTO DIFF WBC: CPT

## 2024-10-11 PROCEDURE — 97116 GAIT TRAINING THERAPY: CPT

## 2024-10-11 PROCEDURE — 97110 THERAPEUTIC EXERCISES: CPT

## 2024-10-11 PROCEDURE — 6360000002 HC RX W HCPCS: Performed by: INTERNAL MEDICINE

## 2024-10-11 PROCEDURE — 80069 RENAL FUNCTION PANEL: CPT

## 2024-10-11 RX ORDER — METOPROLOL TARTRATE 25 MG/1
25 TABLET, FILM COATED ORAL 2 TIMES DAILY
Qty: 60 TABLET | Refills: 3 | Status: SHIPPED | OUTPATIENT
Start: 2024-10-11

## 2024-10-11 RX ORDER — HYDROCODONE BITARTRATE AND ACETAMINOPHEN 5; 325 MG/1; MG/1
1 TABLET ORAL EVERY 6 HOURS PRN
Qty: 10 TABLET | Refills: 0 | Status: SHIPPED | OUTPATIENT
Start: 2024-10-11 | End: 2024-10-14

## 2024-10-11 RX ADMIN — GABAPENTIN 300 MG: 300 CAPSULE ORAL at 09:39

## 2024-10-11 RX ADMIN — SODIUM CHLORIDE, PRESERVATIVE FREE 10 ML: 5 INJECTION INTRAVENOUS at 09:39

## 2024-10-11 RX ADMIN — HYDROCODONE BITARTRATE AND ACETAMINOPHEN 1 TABLET: 5; 325 TABLET ORAL at 09:38

## 2024-10-11 RX ADMIN — Medication 1 G: at 09:38

## 2024-10-11 RX ADMIN — METOPROLOL TARTRATE 25 MG: 25 TABLET, FILM COATED ORAL at 09:38

## 2024-10-11 RX ADMIN — ENOXAPARIN SODIUM 40 MG: 100 INJECTION SUBCUTANEOUS at 09:39

## 2024-10-11 RX ADMIN — Medication 1 G: at 12:50

## 2024-10-11 RX ADMIN — HYDROCODONE BITARTRATE AND ACETAMINOPHEN 1 TABLET: 5; 325 TABLET ORAL at 15:41

## 2024-10-11 RX ADMIN — ACETAMINOPHEN 325MG 650 MG: 325 TABLET ORAL at 12:50

## 2024-10-11 RX ADMIN — BUSPIRONE HYDROCHLORIDE 10 MG: 10 TABLET ORAL at 09:39

## 2024-10-11 RX ADMIN — PANTOPRAZOLE SODIUM 40 MG: 40 TABLET, DELAYED RELEASE ORAL at 05:59

## 2024-10-11 ASSESSMENT — PAIN SCALES - WONG BAKER
WONGBAKER_NUMERICALRESPONSE: HURTS LITTLE MORE
WONGBAKER_NUMERICALRESPONSE: HURTS LITTLE MORE

## 2024-10-11 ASSESSMENT — PAIN SCALES - GENERAL
PAINLEVEL_OUTOF10: 7
PAINLEVEL_OUTOF10: 4
PAINLEVEL_OUTOF10: 3
PAINLEVEL_OUTOF10: 5
PAINLEVEL_OUTOF10: 8

## 2024-10-11 ASSESSMENT — PAIN DESCRIPTION - ORIENTATION
ORIENTATION: RIGHT

## 2024-10-11 ASSESSMENT — PAIN DESCRIPTION - LOCATION
LOCATION: ANKLE
LOCATION: LEG
LOCATION: ANKLE

## 2024-10-11 ASSESSMENT — PAIN DESCRIPTION - DESCRIPTORS
DESCRIPTORS: STABBING
DESCRIPTORS: STABBING

## 2024-10-11 ASSESSMENT — PAIN - FUNCTIONAL ASSESSMENT
PAIN_FUNCTIONAL_ASSESSMENT: PREVENTS OR INTERFERES SOME ACTIVE ACTIVITIES AND ADLS
PAIN_FUNCTIONAL_ASSESSMENT: PREVENTS OR INTERFERES SOME ACTIVE ACTIVITIES AND ADLS

## 2024-10-11 NOTE — PROGRESS NOTES
Report called to geovany guzman spoke to Cathie ROGERS. All questions answered. Cathie requested that we send patient with pain medications, message sent to Dr. Christianson about the matter.   Electronically signed by Medardo Palmer RN on 10/11/2024 at 2:13 PM

## 2024-10-11 NOTE — CARE COORDINATION
prior to discharge from the paper chart on the unit.  Electronic documentation has been entered into epic for IMM letter #2 and original paper copy has been added to the paper chart at the nurses station.     Additional CM Notes:   DC order noted.  Transportation arranged with [facility] at 4 pm.  [facility] admission liaison notified of discharge and transport.  Bedside nurse made aware.  Spoke to patient and  at bedside.    The Plan for Transition of Care is related to the following treatment goals of Hyponatremia [E87.1]  Closed fracture dislocation of right ankle, initial encounter [S82.891A]    The Patient and/or patient representative Little and her family were provided with a choice of provider and agrees with the discharge plan Yes    Freedom of choice list was provided with basic dialogue that supports the patient's individualized plan of care/goals and shares the quality data associated with the providers. Yes    Lenora Zhao RN  [facility]   Case Management Department  Ph: [phone]

## 2024-10-11 NOTE — CARE COORDINATION
COREY/AAVAILITY AUTHORIZATION INFORMATION      LOCATION:  [facility]    EFFECTIVE DATE:  10/11 THRU 10/17/2024    NEXT REVIEW DATE:  10/17/2024    AUTH#:  [id number]     Electronically signed by Jess Belcher, Case Management Assistant Jess Belcher on 10/11/2024 at 9:01 AM

## 2024-10-11 NOTE — PROGRESS NOTES
Occupational Therapy  Facility/Department: [address] PROGRESSIVE CARE  Occupational Therapy Daily Treatment    Name: Little Conrad  : 1950  MRN: [id number]  Date of Service: 10/11/2024    Discharge Recommendations:  Patient would benefit from continued therapy after discharge, 3-5 sessions per week  Little Conrad scored a 17/24 on the AM-PAC ADL Inpatient form. Current research shows that an AM-PAC score of 17 or less is typically not associated with a discharge to the patient's home setting. Based on the patient's AM-PAC score and their current ADL deficits, it is recommended that the patient have 3-5 sessions per week of Occupational Therapy at d/c to increase the patient's independence.  Please see assessment section for further patient specific details.    If patient discharges prior to next session this note will serve as a discharge summary.  Please see below for the latest assessment towards goals.          Patient Diagnosis(es): The primary encounter diagnosis was Closed fracture dislocation of right ankle, initial encounter. A diagnosis of Hyponatremia was also pertinent to this visit.  Past Medical History:  has a past medical history of Acid reflux, Depression, GERD (gastroesophageal reflux disease), Hypertension, IBS (irritable bowel syndrome), Insomnia, Panic attacks, and Tardive dyskinesia.  Past Surgical History:  has a past surgical history that includes Colonoscopy; Hysterectomy; Breast surgery; back surgery; shoulder surgery (Right); Foot surgery (Left); Colonoscopy (N/A, 10/18/2023); Upper gastrointestinal endoscopy (N/A, 10/18/2023); and Ankle fracture surgery (Right, 10/7/2024).    Treatment Diagnosis: decreased fxl mobility/ADLs/transfers      Assessment  Performance deficits / Impairments: Decreased functional mobility ;Decreased balance;Decreased ADL status;Decreased strength  Assessment: Pt w/ good tolerance to session this date. She was able to complete bed mob w/ SBA, don

## 2024-10-11 NOTE — PROGRESS NOTES
Squad came to pickup patient. Rollins was removed prior to d/c. Patient requesting pain medicine prior to departure. Norco given. Previous nurse called report to nursing facility. All belongings sent with patients . Transfer of care complete

## 2024-10-11 NOTE — PLAN OF CARE
Problem: Safety - Adult  Goal: Free from fall injury  10/11/2024 0955 by Medardo Palmer RN  Outcome: Progressing  10/11/2024 0424 by Kylie Billingsley RN  Outcome: Progressing     Problem: Discharge Planning  Goal: Discharge to home or other facility with appropriate resources  10/11/2024 0955 by Medardo Palmer RN  Outcome: Progressing  10/11/2024 0424 by Kylie Billingsley RN  Outcome: Progressing     Problem: Pain  Goal: Verbalizes/displays adequate comfort level or baseline comfort level  10/11/2024 0955 by Medardo Palmer RN  Outcome: Progressing  10/11/2024 0424 by Kylie Billingsley RN  Outcome: Progressing     Problem: Skin/Tissue Integrity  Goal: Absence of new skin breakdown  Description: 1.  Monitor for areas of redness and/or skin breakdown  2.  Assess vascular access sites hourly  3.  Every 4-6 hours minimum:  Change oxygen saturation probe site  4.  Every 4-6 hours:  If on nasal continuous positive airway pressure, respiratory therapy assess nares and determine need for appliance change or resting period.  10/11/2024 0955 by Medardo Palmer RN  Outcome: Progressing  10/11/2024 0424 by Kylie Billingsley RN  Outcome: Progressing     Problem: Neurosensory - Adult  Goal: Achieves stable or improved neurological status  10/11/2024 0955 by Medardo Palmer RN  Outcome: Progressing  10/11/2024 0424 by Kylie Billingsley RN  Outcome: Progressing     Problem: Skin/Tissue Integrity - Adult  Goal: Skin integrity remains intact  10/11/2024 0955 by Medardo Palmer RN  Outcome: Progressing  10/11/2024 0424 by Kylie Billingsley RN  Outcome: Progressing  Goal: Incisions, wounds, or drain sites healing without S/S of infection  10/11/2024 0955 by Medardo Palmer RN  Outcome: Progressing  10/11/2024 0424 by Kylie Billingsley RN  Outcome: Progressing     Problem: Musculoskeletal - Adult  Goal: Return mobility to safest level of function  10/11/2024 0955 by Medardo Palmer RN  Outcome: Progressing  10/11/2024 0424 by yKlie Billingsley

## 2024-10-11 NOTE — DISCHARGE INSTR - COC
Continuity of Care Form    Patient Name: Little Conrad   :  1950  MRN:  [id number]    Admit date:  10/6/2024  Discharge date:  10/11/2024    Code Status Order: Full Code   Advance Directives:   Advance Care Flowsheet Documentation        Date/Time Healthcare Directive Type of Healthcare Directive Copy in Chart Healthcare Agent Appointed Healthcare Agent's Name Healthcare Agent's Phone Number    10/07/24 0834 Yes, patient has an advance directive for healthcare treatment  Durable power of  for health care  --  Spouse  Enoch Conrad  [phone]     10/07/24 0432 No, patient does not have an advance directive for healthcare treatment  --  --  --  --  --                     Admitting Physician:  Maylin Kirkpatrick MD  PCP: Fredis Smiley MD    Discharging Nurse: SUE Batres  Discharging Hospital Unit/Room#: E3M-7872/5280-01  Discharging Unit Phone Number: [phone]    Emergency Contact:   Extended Emergency Contact Information  Primary Emergency Contact: Darius Conrad  Address: [address] [place] [address]  Home Phone: [phone]  Relation: Spouse    Past Surgical History:  Past Surgical History:   Procedure Laterality Date    ANKLE FRACTURE SURGERY Right 10/7/2024    OPEN REDUCTION INTERNAL FIXATION RIGHT ANKLE FRACTURE DISLOCATION performed by Delisa Landis MD at [facility] OR    BACK SURGERY      cervical    BREAST SURGERY      lumpectomy    COLONOSCOPY      COLONOSCOPY N/A 10/18/2023    COLONOSCOPY POLYPECTOMY SNARE/COLD BIOPSY performed by Jyotsna Quintana MD at [facility] ENDOSCOPY    FOOT SURGERY Left     HYSTERECTOMY (CERVIX STATUS UNKNOWN)      SHOULDER SURGERY Right     rotator cuff    UPPER GASTROINTESTINAL ENDOSCOPY N/A 10/18/2023    EGD BIOPSY performed by Jyotsna Quintana MD at [facility] ENDOSCOPY       Immunization History:   Immunization History   Administered Date(s) Administered    COVID-19, PFIZER GRAY top, DO NOT Dilute, (age 11

## 2024-10-11 NOTE — PROGRESS NOTES
chair, All fall risk precautions in place  Restraints  Restraints Initially in Place: No    Restrictions  Restrictions/Precautions  Restrictions/Precautions: Fall Risk, Weight Bearing  Lower Extremity Weight Bearing Restrictions  Right Lower Extremity Weight Bearing: Non Weight Bearing  Position Activity Restriction  Other position/activity restrictions: bassett catheter     Subjective  General  Chart Reviewed: Yes  Patient assessed for rehabilitation services?: Yes  Additional Pertinent Hx: 73 y/o female admit 10/6/2024 with R Ankle Fx/Dislocation, Hyponatremia.  Closed Reduction/Splint in ED.  Admit, Ortho consult.  10/7/2024 S/P R ORIF.  PMH as noted including Neck Surg, Shld (RTC, R) Surg, L Foot Surg, IBS, HTN, Tardive Dyskinesthia, Panic Attacks.  Response To Previous Treatment: Not applicable  Family / Caregiver Present: Yes ( (Enoch))  Referring Practitioner: Dr. Christianson.  Follows Commands: Within Functional Limits  Subjective  Subjective: Pt/family agreeable to PT treatment. Pt motivated to progress in therapy.         Social/Functional History  Social/Functional History  Lives With: Spouse  Type of Home: House  Home Layout: Two level (Bilevel)  Home Access: Stairs to enter with rails (Level : garage into lower level.  1/2 bath on lower level. 6+6 steps to main level.)  Bathroom Shower/Tub: Walk-in shower  Bathroom Toilet: Standard  Bathroom Equipment: Toilet raiser  Bathroom Accessibility: Accessible  Home Equipment: Cane, Walker - Rolling, Wheelchair - Manual ( rented ([employer])  W/C (transport chair))  Has the patient had two or more falls in the past year or any fall with injury in the past year?: Yes  ADL Assistance: Independent  Ambulation Assistance: Independent (With Cane pta.)  Transfer Assistance: Independent  Active : Yes  Occupation: Retired  Additional Comments:  retired.  Reports possible ability to d/c at least briefly to sister's home : level entry; 1st floor  bed/bath.  Vision/Hearing  Vision  Vision: Impaired  Vision Exceptions: Cataracts  Hearing  Hearing: Within functional limits    Cognition   Orientation  Overall Orientation Status: Within Functional Limits  Cognition  Overall Cognitive Status: WFL    Objective     Observation/Palpation  Observation: R Ankle/Foot Immob.     Bed mobility  Supine to Sit: Stand by assistance  Scooting: Stand by assistance  Bed Mobility Comments: HOB elevated    Transfers  Sit to Stand: Contact guard assistance  Stand to Sit: Contact guard assistance  Comment: Pt with good safety awareness, occasional cues to maintain NWB R LE.    Ambulation  Surface: Level tile  Device: Rolling Walker  Assistance: Contact guard assistance;Minimal assistance  Quality of Gait: hop LLE, increased time and progressing effectiveness using BUEs on RW to lift (c/o LLE thigh pain due to compensation)  Distance: 5' + 15' x 3  Comments: initial chair follow >>> chair placement in the room     Balance  Posture: Good  Sitting - Static: Good  Sitting - Dynamic: Good  Standing - Static: Fair  Standing - Dynamic: Fair;-  Comments: Able to maintain sitting balance EOB with supervision.  Able to maintain standing balance with RW, CGA-Min A (with turns)    Exercise Treatment: see OT note for BUE circuit  Dynamic Sitting Balance Exercises: Seated in chair: B x 10 dawn CHOWDHURY, hip ADD squeeze + hip ABD/ADD    AM-PAC - Mobility    AM-PAC Basic Mobility - Inpatient   How much help is needed turning from your back to your side while in a flat bed without using bedrails?: A Little  How much help is needed moving from lying on your back to sitting on the side of a flat bed without using bedrails?: A Little  How much help is needed moving to and from a bed to a chair?: A Little  How much help is needed standing up from a chair using your arms?: A Little  How much help is needed walking in hospital room?: A Little  How much help is needed climbing 3-5 steps with a railing?:

## 2024-10-11 NOTE — PROGRESS NOTES
Occupational Therapy Attempt  Little Conrad  10/11/2024  [id number]    Pt met b/s, politely declined therapy at this time as she is watching a family member's  on an ipad. She asked for therapy to come back after lunch.     Electronically signed by CECY Gray/L [id number] on 10/11/24 at 11:39 AM EDT

## 2024-10-13 NOTE — PROGRESS NOTES
HYDROcodone-acetaminophen 5-325 MG per tablet  Commonly known as: Norco  Take 1 tablet by mouth every 6 hours as needed for Pain for up to 3 days. Intended supply: 3 days. Take lowest dose possible to manage pain Max Daily Amount: 4 tablets     metoprolol tartrate 25 MG tablet  Commonly known as: LOPRESSOR  Take 1 tablet by mouth 2 times daily            CONTINUE taking these medications      busPIRone 10 MG tablet  Commonly known as: BUSPAR     calcium carbonate 500 MG Tabs tablet  Commonly known as: OSCAL     cyanocobalamin 500 MCG tablet     fish oil 1000 MG capsule     Flax Seed Oil 1000 MG Caps     gabapentin 300 MG capsule  Commonly known as: NEURONTIN     irbesartan 150 MG tablet  Commonly known as: AVAPRO     melatonin 5 MG Tabs tablet     METAMUCIL FIBER PO     ondansetron 4 MG tablet  Commonly known as: ZOFRAN     pantoprazole 40 MG tablet  Commonly known as: PROTONIX     solifenacin 10 MG tablet  Commonly known as: VESICARE     Valbenazine Tosylate 40 MG Caps     zolpidem 10 MG tablet  Commonly known as: AMBIEN            ASK your doctor about these medications      glycopyrrolate 2 MG tablet  Commonly known as: ROBINUL     Myrbetriq 25 MG Tb24  Generic drug: mirabegron               Where to Get Your Medications        These medications were sent to [facility] [address] - P [phone] - F [phone]  [address] [place] [address]      Phone: [phone]   HYDROcodone-acetaminophen 5-325 MG per tablet  metoprolol tartrate 25 MG tablet        Objective Findings at Discharge:   BP (!) 146/81   Pulse 83   Temp 98.8 °F (37.1 °C) (Oral)   Resp 17   Ht 1.549 m (5' 0.98\")   Wt 71.6 kg (157 lb 13.6 oz)   SpO2 99%   BMI 29.84 kg/m²       Physical Exam:     General: NAD  Eyes: EOMI  ENT: neck supple  Cardiovascular: Regular rate.  Respiratory: Clear to auscultation  Gastrointestinal: Soft, non tender  Genitourinary: no suprapubic

## 2024-10-15 ENCOUNTER — TELEPHONE (OUTPATIENT)
Dept: ORTHOPEDIC SURGERY | Age: 74
End: 2024-10-15

## 2024-10-15 NOTE — TELEPHONE ENCOUNTER
Medical Facility Question     Facility Name: [facility]   Contact Name: CODEY  Contact Number: [phone]  Request or Information: CODEY IS CALLING TO GET DIRECT ORDERS REGARDING PATIENTS FRACTURE.

## 2024-10-15 NOTE — TELEPHONE ENCOUNTER
POSTOPERATIVE PLAN:  The patient will be readmitted as an inpatient.  She is nonweightbearing for at least 6 weeks, but we can start range of motion of the ankle in 2 weeks.  She may need a rehab placement.     2 Week PO needs scheduled.  Sx 10/8/24\  Schedule for week on 10/22/24

## 2024-10-23 ENCOUNTER — OFFICE VISIT (OUTPATIENT)
Dept: ORTHOPEDIC SURGERY | Age: 74
End: 2024-10-23

## 2024-10-23 ENCOUNTER — TELEPHONE (OUTPATIENT)
Dept: ORTHOPEDIC SURGERY | Age: 74
End: 2024-10-23

## 2024-10-23 VITALS — WEIGHT: 157 LBS | BODY MASS INDEX: 30.82 KG/M2 | RESPIRATION RATE: 15 BRPM | HEIGHT: 60 IN

## 2024-10-23 DIAGNOSIS — S93.431A SYNDESMOTIC DISRUPTION OF RIGHT ANKLE, INITIAL ENCOUNTER: ICD-10-CM

## 2024-10-23 DIAGNOSIS — S82.891A CLOSED FRACTURE DISLOCATION OF RIGHT ANKLE, INITIAL ENCOUNTER: Primary | ICD-10-CM

## 2024-10-23 NOTE — TELEPHONE ENCOUNTER
Medical Facility Question     Facility Name: [facility]  Contact Name: NADIYA  Contact Number: [phone]  Request or Information:       FAX # [phone]    THE FACILITY NEEDS THE AVS WITH INSTRUCTIONS ABOUT THE BOOT THE PATIENT WENT BACK TO THE FACILITY IN, FAXED PLEASE

## 2024-10-25 NOTE — PROGRESS NOTES
DIAGNOSIS:    1-Right ankle unstable fracture dislocation, status post ORIF.  2-Right ankle distal tibia fibular syndesmosis disruption, s/p ORIF syndesmosis screw    DATE OF SURGERY:  10/7/2024.    HISTORY OF PRESENT ILLNESS:  Ms. oCnrad 74 y.o.  female who came in today for 2 weeks postoperative visit.  The patient denies any significant pain in the right ankle. Rates pain a 7/10 VAS moderate, aching, intermittent and are improving. Aggravating factors movement. Alleviating factors rest. She has been in a splint, and non WB.  No numbness or tingling sensation. No fever or Chills. She is in a rehab center working with PT.     PHYSICAL EXAMINATION:  The incision healing well.  No signs of any erythema or drainage, minimal swelling. She has no pain with the active or passive range of motion of the right ankle, but decrease ROM.  She has intact sensation distally, and she is neurovascularly intact.    IMAGING:  Three views right ankle taken today in the office showed anatomic alignment of the fracture, plate and screws in good position, no loosening. Ankle mortise is well centered. Syndesmosis screw intact.     IMPRESSION:  2 weeks out from   1-Right ankle unstable fracture dislocation, status post ORIF.  2-Right ankle distal tibia fibular syndesmosis disruption, s/p ORIF syndesmosis screw      PLAN: She placed in a boot, and non WB for 6 weeks. I have told the patient to work on ROM. ASA 81 mg BID for DVT prophylaxis. The patient will come back for a follow up in 6 weeks.  At that time, we will take 3 views of the right ankle standing.She will likely need a staged procedure for syndesmosis screw removal at 4-5 months.         Procedures    [facility] / Airselect Tall Walking Boot     Patient was prescribed a Breg Tall Rosey Walking Boot.  The right ankle will require stabilization / immobilization from this semi-rigid / rigid orthosis to improve their function.  The orthosis will assist in protecting the

## 2024-11-21 ENCOUNTER — TELEPHONE (OUTPATIENT)
Dept: ORTHOPEDIC SURGERY | Age: 74
End: 2024-11-21

## 2024-11-21 NOTE — TELEPHONE ENCOUNTER
Medical Facility Question     Facility Name: [facility]   Contact Name: BRANDON TAVAREZ   Contact Number: [phone]   Request or Information: Brandon was calling he just would like to get a summary of this patient weight bearing for  that right ankle. Patient stated that on her last visit with Deandra Cramer she could start putting on weight on that rt ankle . Brandon would like to get and summary of that emailto him at this email: [email or web address]

## 2024-11-21 NOTE — TELEPHONE ENCOUNTER
No answer.LVM to return call with Fax number. Office note can not be emailed.     PLAN: She placed in a boot, and non WB for 6 weeks. I have told the patient to work on ROM. ASA 81 mg BID for DVT prophylaxis. The patient will come back for a follow up in 6 weeks.  At that time, we will take 3 views of the right ankle standing.She will likely need a staged procedure for syndesmosis screw removal at 4-5 months.

## 2024-12-04 ENCOUNTER — OFFICE VISIT (OUTPATIENT)
Dept: ORTHOPEDIC SURGERY | Age: 74
End: 2024-12-04

## 2024-12-04 VITALS — WEIGHT: 157 LBS | HEIGHT: 60 IN | BODY MASS INDEX: 30.82 KG/M2

## 2024-12-04 DIAGNOSIS — S93.431A SYNDESMOTIC DISRUPTION OF RIGHT ANKLE, INITIAL ENCOUNTER: ICD-10-CM

## 2024-12-04 DIAGNOSIS — S82.891A CLOSED FRACTURE DISLOCATION OF RIGHT ANKLE, INITIAL ENCOUNTER: Primary | ICD-10-CM

## 2024-12-04 PROCEDURE — 99024 POSTOP FOLLOW-UP VISIT: CPT | Performed by: NURSE PRACTITIONER

## 2024-12-05 NOTE — PROGRESS NOTES
DIAGNOSIS:    1-Right ankle unstable fracture dislocation, status post ORIF.  2-Right ankle distal tibia fibular syndesmosis disruption, s/p ORIF syndesmosis screw    DATE OF SURGERY:  10/7/2024.    HISTORY OF PRESENT ILLNESS:  Ms. Conrad 74 y.o.  female who came in today for 8 weeks postoperative visit.  The patient denies any significant pain in the right ankle. Rates pain a 2/10 VAS mild, aching, intermittent and are improving. Aggravating factors movement. Alleviating factors rest. She has been in a brace, and non WB.  No numbness or tingling sensation. No fever or Chills. She is in a rehab center working with PT.     PHYSICAL EXAMINATION:  The incision healing well.  No signs of any erythema or drainage, minimal swelling. She has no pain with the active or passive range of motion of the right ankle, but decrease ROM.  She has intact sensation distally, and she is neurovascularly intact.    IMAGING:  Three views right ankle taken today in the office showed anatomic alignment of the fracture, plate and screws in good position, no loosening. Ankle mortise is well centered. Syndesmosis screw intact.     IMPRESSION:  8 weeks out from   1-Right ankle unstable fracture dislocation, status post ORIF.  2-Right ankle distal tibia fibular syndesmosis disruption, s/p ORIF syndesmosis screw      PLAN: She will be WBAT in the boot, and start aggressive ROM and peroneal strengthening exercise. Off the boot in 1 weeks. No heavy impact activities. The patient will come back for a follow up in 6 weeks.  At that time, we will take 3 views of the right ankle standing. .She will likely need a staged procedure for syndesmosis screw removal at 4-5 months.           Deandra Cramer, APRN - CNP

## 2024-12-19 ENCOUNTER — TELEPHONE (OUTPATIENT)
Dept: ORTHOPEDIC SURGERY | Age: 74
End: 2024-12-19

## 2024-12-19 DIAGNOSIS — S82.891A CLOSED FRACTURE DISLOCATION OF RIGHT ANKLE, INITIAL ENCOUNTER: Primary | ICD-10-CM

## 2024-12-19 DIAGNOSIS — S93.431A SYNDESMOTIC DISRUPTION OF RIGHT ANKLE, INITIAL ENCOUNTER: ICD-10-CM

## 2024-12-19 NOTE — TELEPHONE ENCOUNTER
General Question     Subject: POST OP CONCERN  Patient and /or Facility Request: Little Conrad   Contact Number: [phone]     PATIENT IS CONCERNED THAT ANKLE IS STILL SWOLLEN AND PAINFUL WOULD LIKE TO KNOW IF THAT IS NORMAL     ALSO NEEDS A REFERRAL FOR OUTPATIENT PHYSICAL THERAPY    PLEASE CALL PATIENT TO ADVISE

## 2024-12-19 NOTE — TELEPHONE ENCOUNTER
NEHEMIAH patient. Informed her that the pain and swelling is normal. Advised her to continue to ice and elevate and wear compression socks.   PT order was placed in chart. Phone number and address to [facility] was given to patient.

## 2024-12-23 NOTE — PLAN OF CARE
(UNTIMED) #     Therex (02471)     EVAL:LOW (31180 - Typically 20 minutes face-to-face) 1    Neuromusc. Re-ed (23256) 25 2  Re-Eval (92775)     Manual (10124)    Estim Unattended (57249)     Ther. Act (66848)    Mech. Traction (51636)     Gait (86197)    Dry Needle 1-2 muscle (42814)     Aquatic Therex (34469)    Dry Needle 3+ muscle (49188)     Iontophoresis (62353)    VASO (51801)     Ultrasound (91799)    Group Therapy (11095)     Estim Attended (61514)    Canalith Repositioning (55772)     Physical Performance Test (00703)    Custom orthotic ()     Other:    Other:    Total Timed Code Tx Minutes 25 2  1     Total Treatment Minutes 40        Charge Justification:  (31463) NEUROMUSCULAR RE-EDUCATION - Therapeutic procedure, 1 or more areas, each 15 minutes; neuromuscular reeducation of movement, balance, coordination, kinesthetic sense, posture, and/or proprioception for sitting and/or standing activities  (92828) HOME EXERCISE PROGRAM - Reviewed/Progressed HEP activities related to neuromuscular reeducation of movement, balance, coordination, kinesthetic sense, posture, and/or proprioception for sitting and/or standing activities      GOALS     Patient stated goal: getting more mobile  [] Progressing: [] Met: [] Not Met: [] Adjusted    Therapist goals for Patient:   Short Term Goals: To be achieved in: 2 weeks  1. Independent in HEP and progression per patient tolerance, in order to prevent re-injury.   [] Progressing: [] Met: [] Not Met: [] Adjusted  2. Patient will have a decrease in pain from 4/10 to maximum of 2/10 on VAS at rest and with activity to facilitate improved tolerance to movement, improved tolerance to functional mobility tasks such as ambulation at home and within the community, and improved tolerance to ADLs such as dressing and self-care activities.   [] Progressing: [] Met: [] Not Met: [] Adjusted    Long Term Goals: To be achieved in: 8-10 weeks  1. Patient will demonstrate a raw score

## 2024-12-31 ENCOUNTER — HOSPITAL ENCOUNTER (OUTPATIENT)
Dept: PHYSICAL THERAPY | Age: 74
Setting detail: THERAPIES SERIES
Discharge: HOME | End: 2024-12-31
Attending: ORTHOPAEDIC SURGERY
Payer: MEDICARE

## 2024-12-31 DIAGNOSIS — M25.571 RIGHT ANKLE PAIN, UNSPECIFIED CHRONICITY: ICD-10-CM

## 2024-12-31 DIAGNOSIS — Z78.9 NEED FOR HOME EXERCISE PROGRAM: ICD-10-CM

## 2024-12-31 DIAGNOSIS — R52 PAIN AGGRAVATED BY STANDING: ICD-10-CM

## 2024-12-31 DIAGNOSIS — R68.89 DECREASED ACTIVITY TOLERANCE: ICD-10-CM

## 2024-12-31 DIAGNOSIS — R53.1 FUNCTIONAL WEAKNESS: ICD-10-CM

## 2024-12-31 DIAGNOSIS — R26.89 DECREASED FUNCTIONAL MOBILITY: Primary | ICD-10-CM

## 2024-12-31 PROCEDURE — 97112 NEUROMUSCULAR REEDUCATION: CPT

## 2024-12-31 PROCEDURE — 97161 PT EVAL LOW COMPLEX 20 MIN: CPT

## 2025-01-03 ENCOUNTER — HOSPITAL ENCOUNTER (OUTPATIENT)
Dept: PHYSICAL THERAPY | Age: 75
Setting detail: THERAPIES SERIES
Discharge: HOME | End: 2025-01-03
Attending: ORTHOPAEDIC SURGERY
Payer: MEDICARE

## 2025-01-03 PROCEDURE — 97112 NEUROMUSCULAR REEDUCATION: CPT

## 2025-01-03 PROCEDURE — 97110 THERAPEUTIC EXERCISES: CPT

## 2025-01-03 NOTE — FLOWSHEET NOTE
8-10 weeks  1. Patient will demonstrate a raw score of 60/84 or more for the FAAM - ADL section to facilitate improved tolerance to ADLs and functional activities including ambulation, self-care activities, and community navigation to facilitate full return to prior level of function.  [] Progressing: [] Met: [] Not Met: [] Adjusted  2. Patient will demonstrate increased AROM of R ankle dorsiflexion to 5 degrees without pain to allow for proper joint functioning to enable patient to perform stair navigation tasks without pain or restriction.   [] Progressing: [] Met: [] Not Met: [] Adjusted  3. Patient will demonstrate an increase in strength to 4+/5 global ankle musculature to allow for proper functional mobility and improved tolerance to ADLs including cooking, cleaning, and light housework as indicated by patients Functional Deficits.  [] Progressing: [] Met: [] Not Met: [] Adjusted  4. Patient will report the ability to ambulate 20 minutes independently without increase in symptoms in order to promote improved functional mobility and navigation within the community.   [] Progressing: [] Met: [] Not Met: [] Adjusted    Overall Progression Towards Functional goals/ Treatment Progress Update:  [] Patient is progressing as expected towards functional goals listed.    [] Progression is slowed due to complexities/Impairments listed.  [] Progression has been slowed due to co-morbidities.  [x] Plan just implemented, too soon (<30days) to assess goals progression   [] Goals require adjustment due to lack of progress  [] Patient is not progressing as expected and requires additional follow up with physician  [] Other:     TREATMENT PLAN     Frequency/Duration: 1-2x/week for 8-10 weeks for the following treatment interventions:    Interventions:  Therapeutic Exercise (18250) including: strength training, ROM, and functional mobility  Therapeutic Activities (05815) including: functional mobility training and

## 2025-01-07 ENCOUNTER — APPOINTMENT (OUTPATIENT)
Dept: PHYSICAL THERAPY | Age: 75
End: 2025-01-07
Attending: ORTHOPAEDIC SURGERY
Payer: MEDICARE

## 2025-01-08 ENCOUNTER — HOSPITAL ENCOUNTER (OUTPATIENT)
Dept: PHYSICAL THERAPY | Age: 75
Setting detail: THERAPIES SERIES
Discharge: HOME | End: 2025-01-08
Attending: ORTHOPAEDIC SURGERY
Payer: MEDICARE

## 2025-01-08 PROCEDURE — 97112 NEUROMUSCULAR REEDUCATION: CPT

## 2025-01-08 PROCEDURE — 97110 THERAPEUTIC EXERCISES: CPT

## 2025-01-08 NOTE — FLOWSHEET NOTE
[facility]- Outpatient Rehabilitation and Therapy [address], [address], [place] [address] office: [phone] fax: [phone]       Physical Therapy: TREATMENT/PROGRESS NOTE   Patient: Little Conrad (74 y.o. female)   Examination Date: 2025   :  1950 MRN: [id number]   Visit #: 3   Insurance Allowable Auth Needed   AETNA MEDICARE  BMN []Yes    [x]No    Insurance: Payor: AETNA MEDICARE / Plan: AETNA MEDICARE-ADVANTAGE PPO / Product Type: Medicare /   Insurance ID: [id number] - (Medicare Managed)  Secondary Insurance (if applicable):    Treatment Diagnosis:     ICD-10-CM    1. Decreased functional mobility  R26.89       2. Decreased activity tolerance  R68.89       3. Pain aggravated by standing  R52       4. Functional weakness  R53.1       5. Right ankle pain, unspecified chronicity  M25.571       6. Need for home exercise program  Z78.9          Medical Diagnosis:  Closed fracture dislocation of right ankle, initial encounter [S82.891A]  Syndesmotic disruption of right ankle, initial encounter [S93.431A]   Referring Physician: Delisa Landis MD  PCP: Fredis Smiley MD     Plan of care signed (Y/N): YES    Date of Patient follow up with Physician:      Progress Report/POC: NO  Progress note due: 2025 (OR 10 visits /OR AUTH LIMITS, whichever is less)  POC update due: 3/11/25                                            Medical History:  Comorbidities:  Hypertension  Anxiety  Depression  Other: acid reflux, GERD, IBS, insomnia, panic attacks, tardive dyskinesia                                         Precautions/ Contra-indications:           Latex allergy:  NO  Pacemaker:    NO  Contraindications for Manipulation: NA  Date of Surgery: 10/7/24  Other:    Red Flags:  None    Suicide Screening:   The patient did not verbalize a primary behavioral concern, suicidal ideation, suicidal intent, or demonstrate suicidal behaviors.    Preferred Language for Healthcare:   [x]

## 2025-01-10 ENCOUNTER — HOSPITAL ENCOUNTER (OUTPATIENT)
Dept: PHYSICAL THERAPY | Age: 75
Setting detail: THERAPIES SERIES
Discharge: HOME | End: 2025-01-10
Attending: ORTHOPAEDIC SURGERY
Payer: MEDICARE

## 2025-01-10 PROCEDURE — 97110 THERAPEUTIC EXERCISES: CPT

## 2025-01-10 PROCEDURE — 97112 NEUROMUSCULAR REEDUCATION: CPT

## 2025-01-10 NOTE — FLOWSHEET NOTE
Return to Play: NA    Prognosis for POC: [x] Good [] Fair  [] Poor    Patient requires continued skilled intervention: [x] Yes  [] No      CHARGE CAPTURE     PT CHARGE GRID   CPT Code (TIMED) minutes # CPT Code (UNTIMED) #     Therex (76896)  15 1  EVAL:LOW (35939 - Typically 20 minutes face-to-face)     Neuromusc. Re-ed (17312) 25 2  Re-Eval (08668)     Manual (20240)    Estim Unattended (41981)     Ther. Act (77561)    Mech. Traction (66124)     Gait (29740)    Dry Needle 1-2 muscle (57851)     Aquatic Therex (71494)    Dry Needle 3+ muscle (90264)     Iontophoresis (26389)    VASO (45169)     Ultrasound (89828)    Group Therapy (79697)     Estim Attended (37133)    Canalith Repositioning (40500)     Physical Performance Test (84481)    Custom orthotic ()     Other:    Other:    Total Timed Code Tx Minutes 40 3        Total Treatment Minutes 40        Charge Justification:  (36323) THERAPEUTIC EXERCISE - Provided verbal/tactile cueing for activities related to strengthening, flexibility, endurance, ROM performed to prevent loss of range of motion, maintain or improve muscular strength or increase flexibility, following either an injury or surgery.   (63982) NEUROMUSCULAR RE-EDUCATION - Therapeutic procedure, 1 or more areas, each 15 minutes; neuromuscular reeducation of movement, balance, coordination, kinesthetic sense, posture, and/or proprioception for sitting and/or standing activities  (39412) HOME EXERCISE PROGRAM - Reviewed/Progressed HEP activities related to neuromuscular reeducation of movement, balance, coordination, kinesthetic sense, posture, and/or proprioception for sitting and/or standing activities      GOALS     Patient stated goal: getting more mobile  [] Progressing: [] Met: [] Not Met: [] Adjusted    Therapist goals for Patient:   Short Term Goals: To be achieved in: 2 weeks  1. Independent in HEP and progression per patient tolerance, in order to prevent re-injury.   [] Progressing:

## 2025-01-14 ENCOUNTER — HOSPITAL ENCOUNTER (OUTPATIENT)
Dept: PHYSICAL THERAPY | Age: 75
Setting detail: THERAPIES SERIES
Discharge: HOME | End: 2025-01-14
Attending: ORTHOPAEDIC SURGERY
Payer: MEDICARE

## 2025-01-14 PROCEDURE — 97112 NEUROMUSCULAR REEDUCATION: CPT

## 2025-01-14 PROCEDURE — 97110 THERAPEUTIC EXERCISES: CPT

## 2025-01-14 NOTE — FLOWSHEET NOTE
stated goal: getting more mobile  [] Progressing: [] Met: [] Not Met: [] Adjusted    Therapist goals for Patient:   Short Term Goals: To be achieved in: 2 weeks  1. Independent in HEP and progression per patient tolerance, in order to prevent re-injury.   [] Progressing: [] Met: [] Not Met: [] Adjusted  2. Patient will have a decrease in pain from 4/10 to maximum of 2/10 on VAS at rest and with activity to facilitate improved tolerance to movement, improved tolerance to functional mobility tasks such as ambulation at home and within the community, and improved tolerance to ADLs such as dressing and self-care activities.   [] Progressing: [] Met: [] Not Met: [] Adjusted    Long Term Goals: To be achieved in: 8-10 weeks  1. Patient will demonstrate a raw score of 60/84 or more for the FAAM - ADL section to facilitate improved tolerance to ADLs and functional activities including ambulation, self-care activities, and community navigation to facilitate full return to prior level of function.  [] Progressing: [] Met: [] Not Met: [] Adjusted  2. Patient will demonstrate increased AROM of R ankle dorsiflexion to 5 degrees without pain to allow for proper joint functioning to enable patient to perform stair navigation tasks without pain or restriction.   [] Progressing: [] Met: [] Not Met: [] Adjusted  3. Patient will demonstrate an increase in strength to 4+/5 global ankle musculature to allow for proper functional mobility and improved tolerance to ADLs including cooking, cleaning, and light housework as indicated by patients Functional Deficits.  [] Progressing: [] Met: [] Not Met: [] Adjusted  4. Patient will report the ability to ambulate 20 minutes independently without increase in symptoms in order to promote improved functional mobility and navigation within the community.   [] Progressing: [] Met: [] Not Met: [] Adjusted    Overall Progression Towards Functional goals/ Treatment Progress Update:  [] Patient is

## 2025-01-17 ENCOUNTER — HOSPITAL ENCOUNTER (OUTPATIENT)
Dept: PHYSICAL THERAPY | Age: 75
Setting detail: THERAPIES SERIES
Discharge: HOME | End: 2025-01-17
Attending: ORTHOPAEDIC SURGERY
Payer: MEDICARE

## 2025-01-17 PROCEDURE — 97110 THERAPEUTIC EXERCISES: CPT

## 2025-01-17 PROCEDURE — 97112 NEUROMUSCULAR REEDUCATION: CPT

## 2025-01-17 NOTE — FLOWSHEET NOTE
musculature to allow for proper functional mobility and improved tolerance to ADLs including cooking, cleaning, and light housework as indicated by patients Functional Deficits.  [] Progressing: [] Met: [] Not Met: [] Adjusted  4. Patient will report the ability to ambulate 20 minutes independently without increase in symptoms in order to promote improved functional mobility and navigation within the community.   [] Progressing: [] Met: [] Not Met: [] Adjusted    Overall Progression Towards Functional goals/ Treatment Progress Update:  [] Patient is progressing as expected towards functional goals listed.    [] Progression is slowed due to complexities/Impairments listed.  [] Progression has been slowed due to co-morbidities.  [x] Plan just implemented, too soon (<30days) to assess goals progression   [] Goals require adjustment due to lack of progress  [] Patient is not progressing as expected and requires additional follow up with physician  [] Other:     TREATMENT PLAN     Frequency/Duration: 1-2x/week for 8-10 weeks for the following treatment interventions:    Interventions:  Therapeutic Exercise (39178) including: strength training, ROM, and functional mobility  Therapeutic Activities (17838) including: functional mobility training and education.  Neuromuscular Re-education (72925) activation and proprioception, including postural re-education.    Manual Therapy (88596) as indicated to include: Passive Range of Motion, Gr I-IV mobilizations, Soft Tissue Mobilization, and Trigger Point Release  Modalities as needed that may include: Cryotherapy and Vasoneumatic Compression  Patient education on joint protection, postural re-education, activity modification, and progression of HEP    Plan: Cont POC- Continue emphasis/focus on exercise progression, improving proper muscle recruitment and activation/motor control patterns, reduce/eliminate soft tissue swelling/inflammation/restriction, and improving soft tissue

## 2025-01-21 ENCOUNTER — HOSPITAL ENCOUNTER (OUTPATIENT)
Dept: PHYSICAL THERAPY | Age: 75
Setting detail: THERAPIES SERIES
Discharge: HOME | End: 2025-01-21
Attending: ORTHOPAEDIC SURGERY
Payer: MEDICARE

## 2025-01-21 PROCEDURE — 97110 THERAPEUTIC EXERCISES: CPT

## 2025-01-21 PROCEDURE — 97112 NEUROMUSCULAR REEDUCATION: CPT

## 2025-01-21 NOTE — FLOWSHEET NOTE
[facility]- Outpatient Rehabilitation and Therapy [address], [address], [place] [address] office: [phone] fax: [phone]       Physical Therapy: TREATMENT/PROGRESS NOTE   Patient: Little Conrad (74 y.o. female)   Examination Date: 2025   :  1950 MRN: [id number]   Visit #: 7   Insurance Allowable Auth Needed   AETNA MEDICARE  BMN []Yes    [x]No    Insurance: Payor: AETNA MEDICARE / Plan: AETNA MEDICARE-ADVANTAGE PPO / Product Type: Medicare /   Insurance ID: [id number] - (Medicare Managed)  Secondary Insurance (if applicable):    Treatment Diagnosis:     ICD-10-CM    1. Decreased functional mobility  R26.89       2. Decreased activity tolerance  R68.89       3. Pain aggravated by standing  R52       4. Functional weakness  R53.1       5. Right ankle pain, unspecified chronicity  M25.571       6. Need for home exercise program  Z78.9          Medical Diagnosis:  Closed fracture dislocation of right ankle, initial encounter [S82.891A]  Syndesmotic disruption of right ankle, initial encounter [S93.431A]   Referring Physician: Delisa Landis MD  PCP: Fredis Smiley MD     Plan of care signed (Y/N): YES    Date of Patient follow up with Physician:      Progress Report/POC: NO  Progress note due: 2025 (OR 10 visits /OR AUTH LIMITS, whichever is less)  POC update due: 3/11/25                                            Medical History:  Comorbidities:  Hypertension  Anxiety  Depression  Other: acid reflux, GERD, IBS, insomnia, panic attacks, tardive dyskinesia                                         Precautions/ Contra-indications:           Latex allergy:  NO  Pacemaker:    NO  Contraindications for Manipulation: NA  Date of Surgery: 10/7/24  Other:    Red Flags:  None    Suicide Screening:   The patient did not verbalize a primary behavioral concern, suicidal ideation, suicidal intent, or demonstrate suicidal behaviors.    Preferred Language for Healthcare:   [x]

## 2025-01-28 ENCOUNTER — HOSPITAL ENCOUNTER (OUTPATIENT)
Dept: PHYSICAL THERAPY | Age: 75
Setting detail: THERAPIES SERIES
Discharge: HOME | End: 2025-01-28
Attending: ORTHOPAEDIC SURGERY
Payer: MEDICARE

## 2025-01-28 ENCOUNTER — OFFICE VISIT (OUTPATIENT)
Dept: ORTHOPEDIC SURGERY | Age: 75
End: 2025-01-28
Payer: MEDICARE

## 2025-01-28 VITALS — HEIGHT: 60 IN | WEIGHT: 157 LBS | BODY MASS INDEX: 30.82 KG/M2

## 2025-01-28 DIAGNOSIS — S82.891A CLOSED FRACTURE DISLOCATION OF RIGHT ANKLE, INITIAL ENCOUNTER: Primary | ICD-10-CM

## 2025-01-28 PROCEDURE — 97112 NEUROMUSCULAR REEDUCATION: CPT

## 2025-01-28 PROCEDURE — 1159F MED LIST DOCD IN RCRD: CPT | Performed by: ORTHOPAEDIC SURGERY

## 2025-01-28 PROCEDURE — 1123F ACP DISCUSS/DSCN MKR DOCD: CPT | Performed by: ORTHOPAEDIC SURGERY

## 2025-01-28 PROCEDURE — 1125F AMNT PAIN NOTED PAIN PRSNT: CPT | Performed by: ORTHOPAEDIC SURGERY

## 2025-01-28 PROCEDURE — 99213 OFFICE O/P EST LOW 20 MIN: CPT | Performed by: ORTHOPAEDIC SURGERY

## 2025-01-28 PROCEDURE — 97110 THERAPEUTIC EXERCISES: CPT

## 2025-01-28 NOTE — FLOWSHEET NOTE
[facility]- Outpatient Rehabilitation and Therapy [address], [address], [place] [address] office: [phone] fax: [phone]       Physical Therapy: TREATMENT/PROGRESS NOTE   Patient: Little Conrad (74 y.o. female)   Examination Date: 2025   :  1950 MRN: [id number]   Visit #: 8   Insurance Allowable Auth Needed   AETNA MEDICARE  BMN []Yes    [x]No    Insurance: Payor: AETNA MEDICARE / Plan: AETNA MEDICARE-ADVANTAGE PPO / Product Type: Medicare /   Insurance ID: [id number] - (Medicare Managed)  Secondary Insurance (if applicable):    Treatment Diagnosis:     ICD-10-CM    1. Decreased functional mobility  R26.89       2. Decreased activity tolerance  R68.89       3. Pain aggravated by standing  R52       4. Functional weakness  R53.1       5. Right ankle pain, unspecified chronicity  M25.571       6. Need for home exercise program  Z78.9          Medical Diagnosis:  Closed fracture dislocation of right ankle, initial encounter [S82.891A]  Syndesmotic disruption of right ankle, initial encounter [S93.431A]   Referring Physician: Delisa Landis MD  PCP: Fredis Smiley MD     Plan of care signed (Y/N): YES    Date of Patient follow up with Physician:      Progress Report/POC: YES, Date Range for this report: 24 to 25  Progress note due: 2025, 25  POC update due: 3/11/25                                            Medical History:  Comorbidities:  Hypertension  Anxiety  Depression  Other: acid reflux, GERD, IBS, insomnia, panic attacks, tardive dyskinesia                                         Precautions/ Contra-indications:           Latex allergy:  NO  Pacemaker:    NO  Contraindications for Manipulation: NA  Date of Surgery: 10/7/24  Other:    Red Flags:  None    Suicide Screening:   The patient did not verbalize a primary behavioral concern, suicidal ideation, suicidal intent, or demonstrate suicidal behaviors.    Preferred Language for

## 2025-01-29 NOTE — PROGRESS NOTES
DIAGNOSIS:    1-Right ankle unstable fracture dislocation, status post ORIF.  2-Right ankle distal tibia fibular syndesmosis disruption, s/p ORIF 2 syndesmosis screw    DATE OF SURGERY:  10/7/2024.    HISTORY OF PRESENT ILLNESS:  Little Conrad 74 y.o.  female who came in today for 3 months postoperative visit.  The patient denies any significant pain in the right ankle. Rates pain a 1/10 VAS mild, aching, intermittent and are improving. Aggravating factors movement. Alleviating factors rest. She has been in regular shoes, and WB.  No numbness or tingling sensation. No fever or Chills. She is home from rehab center and done with PT.     Past Medical History:   Diagnosis Date    Acid reflux     Depression     GERD (gastroesophageal reflux disease)     Hypertension     IBS (irritable bowel syndrome)     Insomnia     Panic attacks     Tardive dyskinesia        Past Surgical History:   Procedure Laterality Date    ANKLE FRACTURE SURGERY Right 10/7/2024    OPEN REDUCTION INTERNAL FIXATION RIGHT ANKLE FRACTURE DISLOCATION performed by Delisa Landis MD at [facility] OR    BACK SURGERY      cervical    BREAST SURGERY      lumpectomy    COLONOSCOPY      COLONOSCOPY N/A 10/18/2023    COLONOSCOPY POLYPECTOMY SNARE/COLD BIOPSY performed by Jyotsna Quintana MD at [facility] ENDOSCOPY    FOOT SURGERY Left     HYSTERECTOMY (CERVIX STATUS UNKNOWN)      SHOULDER SURGERY Right     rotator cuff    UPPER GASTROINTESTINAL ENDOSCOPY N/A 10/18/2023    EGD BIOPSY performed by Jyotsna Quintana MD at [facility] ENDOSCOPY       Social History     Socioeconomic History    Marital status:      Spouse name: Not on file    Number of children: Not on file    Years of education: Not on file    Highest education level: Not on file   Occupational History    Not on file   Tobacco Use    Smoking status: Former     Current packs/day: 0.00     Types: Cigarettes     Quit date: 2010     Years since quitting: 15.0    Smokeless tobacco: Never

## 2025-02-04 ENCOUNTER — HOSPITAL ENCOUNTER (OUTPATIENT)
Dept: PHYSICAL THERAPY | Age: 75
Setting detail: THERAPIES SERIES
Discharge: HOME | End: 2025-02-04
Attending: ORTHOPAEDIC SURGERY
Payer: MEDICARE

## 2025-02-04 PROCEDURE — 97112 NEUROMUSCULAR REEDUCATION: CPT

## 2025-02-04 PROCEDURE — 97110 THERAPEUTIC EXERCISES: CPT

## 2025-02-04 NOTE — FLOWSHEET NOTE
Ankle Plantar Flexion with Resistance  - 2 x daily - 7 x weekly - 1 sets - 10 reps  - Long Sitting Ankle Dorsiflexion with Anchored Resistance  - 2 x daily - 7 x weekly - 1 sets - 10 reps  - Long Sitting Calf Stretch with Strap  - 2 x daily - 7 x weekly - 1 sets - 3 reps - 30 sec hold    ASSESSMENT   Today's Assessment: Patient demonstrates improving functional mobility through ability to perform reciprocal stair navigation on full flight of stairs independently with only unilateral handrail assist, good control noted. Patient with continued challenge with SL stance, continues to require light fingertip support to complete. Re-initiated tandem stance on stable surface to continue to promote improved balance and stability. Continue to progress as able. Patient would benefit from continued skilled physical therapy to improve balance and lower extremity strength in order to decrease pain and improve tolerance to ADLs and functional activities.     Medical Necessity Documentation:  I certify that this patient meets the below criteria necessary for medical necessity for care and/or justification of therapy services:  The patient has functional impairments and/or activity limitations and would benefit from continued outpatient therapy services to address the deficits outlined in the patients goals  The patient has a musculoskeletal condition(s) with a corresponding ICD-10 code that is of complexity and severity that require skilled therapeutic intervention. This has a direct and significant impact on the need for therapy and significantly impacts the rate of recovery.     Return to Play: NA    Prognosis for POC: [x] Good [] Fair  [] Poor    Patient requires continued skilled intervention: [x] Yes  [] No      CHARGE CAPTURE     PT CHARGE GRID   CPT Code (TIMED) minutes # CPT Code (UNTIMED) #     Therex (11024)  22 2  EVAL:LOW (88437 - Typically 20 minutes face-to-face)     Neuromusc. Re-ed (67641) 18 1  Re-Eval (56200)

## 2025-02-11 ENCOUNTER — APPOINTMENT (OUTPATIENT)
Dept: PHYSICAL THERAPY | Age: 75
End: 2025-02-11
Attending: ORTHOPAEDIC SURGERY
Payer: MEDICARE

## 2025-02-13 ENCOUNTER — HOSPITAL ENCOUNTER (OUTPATIENT)
Dept: PHYSICAL THERAPY | Age: 75
Setting detail: THERAPIES SERIES
Discharge: HOME | End: 2025-02-13
Attending: ORTHOPAEDIC SURGERY
Payer: MEDICARE

## 2025-02-13 PROCEDURE — 97112 NEUROMUSCULAR REEDUCATION: CPT

## 2025-02-13 PROCEDURE — 97110 THERAPEUTIC EXERCISES: CPT

## 2025-02-13 NOTE — FLOWSHEET NOTE
Program: Patient HEP program created electronically.  Refer to [employer] access code: Access Code: [email or web address]  Access Code: [email or web address]  URL: [email or web address]  Date: 12/31/2024  Prepared by: Joanne Najera    Exercises  - Long Sitting Ankle Eversion with Resistance  - 2 x daily - 7 x weekly - 1 sets - 10 reps  - Long Sitting Ankle Inversion with Anchored Resistance  - 2 x daily - 7 x weekly - 1 sets - 10 reps  - Long Sitting Ankle Plantar Flexion with Resistance  - 2 x daily - 7 x weekly - 1 sets - 10 reps  - Long Sitting Ankle Dorsiflexion with Anchored Resistance  - 2 x daily - 7 x weekly - 1 sets - 10 reps  - Long Sitting Calf Stretch with Strap  - 2 x daily - 7 x weekly - 1 sets - 3 reps - 30 sec hold    ASSESSMENT   Today's Assessment: Progressions held on strengthening exercises this date secondary to decreased endurance due to recent illness. Patient demonstrates good tolerance to initiation of postural control on [employer] balance system this date, able to complete with 37% accuracy in 1 min and 9 sec. Plan to progress functional mobility exercises next visit to include step-ups without UE assist. Continue to progress as able. Patient would benefit from continued skilled physical therapy to improve balance and lower extremity strength in order to decrease pain and improve tolerance to ADLs and functional activities.     Medical Necessity Documentation:  I certify that this patient meets the below criteria necessary for medical necessity for care and/or justification of therapy services:  The patient has functional impairments and/or activity limitations and would benefit from continued outpatient therapy services to address the deficits outlined in the patients goals  The patient has a musculoskeletal condition(s) with a corresponding ICD-10 code that is of complexity and severity that require skilled therapeutic intervention. This has a direct and significant impact on the need for therapy and

## 2025-02-18 ENCOUNTER — HOSPITAL ENCOUNTER (OUTPATIENT)
Dept: PHYSICAL THERAPY | Age: 75
Setting detail: THERAPIES SERIES
Discharge: HOME | End: 2025-02-18
Attending: ORTHOPAEDIC SURGERY
Payer: MEDICARE

## 2025-02-18 PROCEDURE — 97112 NEUROMUSCULAR REEDUCATION: CPT

## 2025-02-18 PROCEDURE — 97110 THERAPEUTIC EXERCISES: CPT

## 2025-02-18 NOTE — FLOWSHEET NOTE
[x] Met: [] Not Met: [] Adjusted    Therapist goals for Patient:   Short Term Goals: To be achieved in: 2 weeks  1. Independent in HEP and progression per patient tolerance, in order to prevent re-injury.   [] Progressing: [x] Met: [] Not Met: [] Adjusted  2. Patient will have a decrease in pain from 4/10 to maximum of 2/10 on VAS at rest and with activity to facilitate improved tolerance to movement, improved tolerance to functional mobility tasks such as ambulation at home and within the community, and improved tolerance to ADLs such as dressing and self-care activities.   [] Progressing: [x] Met: [] Not Met: [] Adjusted    Long Term Goals: To be achieved in: 8-10 weeks  1. Patient will demonstrate a raw score of 60/84 or more for the FAAM - ADL section to facilitate improved tolerance to ADLs and functional activities including ambulation, self-care activities, and community navigation to facilitate full return to prior level of function.  [x] Progressing: [] Met: [] Not Met: [] Adjusted  2. Patient will demonstrate increased AROM of R ankle dorsiflexion to 5 degrees without pain to allow for proper joint functioning to enable patient to perform stair navigation tasks without pain or restriction.   [] Progressing: [x] Met: [] Not Met: [] Adjusted  3. Patient will demonstrate an increase in strength to 4+/5 global ankle musculature to allow for proper functional mobility and improved tolerance to ADLs including cooking, cleaning, and light housework as indicated by patients Functional Deficits.  [x] Progressing: [] Met: [] Not Met: [] Adjusted  4. Patient will report the ability to ambulate 20 minutes independently without increase in symptoms in order to promote improved functional mobility and navigation within the community.   [x] Progressing: [] Met: [] Not Met: [] Adjusted    Overall Progression Towards Functional goals/ Treatment Progress Update:  [x] Patient is progressing as expected towards functional

## 2025-02-25 ENCOUNTER — HOSPITAL ENCOUNTER (OUTPATIENT)
Dept: PHYSICAL THERAPY | Age: 75
Setting detail: THERAPIES SERIES
Discharge: HOME | End: 2025-02-25
Attending: ORTHOPAEDIC SURGERY
Payer: MEDICARE

## 2025-02-25 PROCEDURE — 97110 THERAPEUTIC EXERCISES: CPT

## 2025-02-25 PROCEDURE — 97112 NEUROMUSCULAR REEDUCATION: CPT

## 2025-02-26 ENCOUNTER — OFFICE VISIT (OUTPATIENT)
Dept: ORTHOPEDIC SURGERY | Age: 75
End: 2025-02-26
Payer: MEDICARE

## 2025-02-26 ENCOUNTER — PREP FOR PROCEDURE (OUTPATIENT)
Dept: ORTHOPEDIC SURGERY | Age: 75
End: 2025-02-26

## 2025-02-26 VITALS — HEIGHT: 60 IN | WEIGHT: 157 LBS | BODY MASS INDEX: 30.82 KG/M2

## 2025-02-26 DIAGNOSIS — T84.84XA PAINFUL ORTHOPAEDIC HARDWARE: ICD-10-CM

## 2025-02-26 DIAGNOSIS — S93.431A SYNDESMOTIC DISRUPTION OF RIGHT ANKLE, INITIAL ENCOUNTER: ICD-10-CM

## 2025-02-26 DIAGNOSIS — S82.891A CLOSED FRACTURE DISLOCATION OF RIGHT ANKLE, INITIAL ENCOUNTER: Primary | ICD-10-CM

## 2025-02-26 PROCEDURE — 99214 OFFICE O/P EST MOD 30 MIN: CPT | Performed by: ORTHOPAEDIC SURGERY

## 2025-02-26 PROCEDURE — 1159F MED LIST DOCD IN RCRD: CPT | Performed by: ORTHOPAEDIC SURGERY

## 2025-02-26 PROCEDURE — 1123F ACP DISCUSS/DSCN MKR DOCD: CPT | Performed by: ORTHOPAEDIC SURGERY

## 2025-02-26 PROCEDURE — 1125F AMNT PAIN NOTED PAIN PRSNT: CPT | Performed by: ORTHOPAEDIC SURGERY

## 2025-02-26 RX ORDER — TURMERIC ROOT EXTRACT 500 MG
1000 TABLET ORAL DAILY
COMMUNITY

## 2025-02-26 RX ORDER — ASCORBIC ACID 500 MG
500 TABLET ORAL DAILY
COMMUNITY

## 2025-02-26 RX ORDER — FESOTERODINE FUMARATE 8 MG/1
1 TABLET, FILM COATED, EXTENDED RELEASE ORAL DAILY
COMMUNITY

## 2025-02-26 NOTE — PROGRESS NOTES
[facility] PRE-OPERATIVE INSTRUCTIONS    Day of Procedure:  3/3/25              Arrival time:    0600            Surgery time: 0730    Take the following medications with a sip of water:  Follow your MD/Surgeons pre-procedure instructions regarding your medications     Do not eat or drink anything after 12:00 midnight prior to your surgery.  This includes water, chewing gum, mints and ice chips.   You may brush your teeth and gargle the morning of your surgery, but do not swallow the water.     Please see your family doctor/pediatrician for a history and physical and/or concerning medications.   Bring any test results/reports from your physicians office.   If you are under the care of a heart doctor or specialist doctor, please be aware that you may be asked to see them for clearance.    You may be asked to stop blood thinners such as Coumadin, Plavix, Fragmin, Lovenox, etc., or any anti-inflammatories such as:  Aspirin, Ibuprofen, Advil, Naproxen prior to your surgery.    We also ask that you stop any over the counter medications such as fish oil, vitamin E, glucosamine, garlic, Multivitamins, COQ 10, etc.    We ask that you do not smoke 24 hours prior to surgery.  We ask that you do not  drink any alcoholic beverages 24 hours prior to surgery     You must make arrangements for a responsible adult to take you home after your surgery.    For your safety, you will not be allowed to leave alone or drive yourself home.  Your surgery will be cancelled if you do not have a ride home.     Also for your safety, you must have someone stay with you the first 24 hours after your surgery.     A parent or legal guardian must accompany a child scheduled for surgery and plan to stay at the hospital until the child is discharged.    Please do not bring other children with you.    For your comfort, please wear simple loose fitting clothing to the hospital.  Please do not bring valuables.    Do not wear any make-up on

## 2025-02-26 NOTE — PROGRESS NOTES
DIAGNOSIS:    1-Right ankle unstable fracture dislocation, status post ORIF.  2-Right ankle distal tibia fibular syndesmosis disruption, s/p ORIF 2 syndesmosis screw    DATE OF SURGERY:  10/7/2024.    HISTORY OF PRESENT ILLNESS:  Little Conrad 74 y.o.  female who came in today for 4 months postoperative visit.  The patient denies any significant pain in the right ankle. Rates pain a 2/10 VAS mild, aching, intermittent and are improving. Aggravating factors movement. Alleviating factors rest. She has been in regular shoes, and WB.  No numbness or tingling sensation. No fever or Chills. She is home from rehab center and done with PT.     Past Medical History:   Diagnosis Date    Acid reflux     Depression     GERD (gastroesophageal reflux disease)     Hypertension     IBS (irritable bowel syndrome)     Insomnia     Panic attacks     Tardive dyskinesia        Past Surgical History:   Procedure Laterality Date    ANKLE FRACTURE SURGERY Right 10/7/2024    OPEN REDUCTION INTERNAL FIXATION RIGHT ANKLE FRACTURE DISLOCATION performed by Delisa Landis MD at [facility] OR    BACK SURGERY      cervical    BREAST SURGERY      lumpectomy    COLONOSCOPY      COLONOSCOPY N/A 10/18/2023    COLONOSCOPY POLYPECTOMY SNARE/COLD BIOPSY performed by Jyotsna Quintana MD at [facility] ENDOSCOPY    FOOT SURGERY Left     HYSTERECTOMY (CERVIX STATUS UNKNOWN)      SHOULDER SURGERY Right     rotator cuff    UPPER GASTROINTESTINAL ENDOSCOPY N/A 10/18/2023    EGD BIOPSY performed by Jyotsna Quintana MD at [facility] ENDOSCOPY       Social History     Socioeconomic History    Marital status:      Spouse name: Not on file    Number of children: Not on file    Years of education: Not on file    Highest education level: Not on file   Occupational History    Not on file   Tobacco Use    Smoking status: Former     Current packs/day: 0.00     Types: Cigarettes     Quit date: 2010     Years since quitting: 15.1    Smokeless tobacco: Never

## 2025-02-28 ENCOUNTER — ANESTHESIA EVENT (OUTPATIENT)
Dept: OPERATING ROOM | Age: 75
End: 2025-02-28
Payer: MEDICARE

## 2025-03-03 ENCOUNTER — HOSPITAL ENCOUNTER (OUTPATIENT)
Age: 75
Setting detail: OUTPATIENT SURGERY
Discharge: HOME | End: 2025-03-03
Attending: ORTHOPAEDIC SURGERY | Admitting: ORTHOPAEDIC SURGERY
Payer: MEDICARE

## 2025-03-03 ENCOUNTER — APPOINTMENT (OUTPATIENT)
Dept: GENERAL RADIOLOGY | Age: 75
End: 2025-03-03
Attending: ORTHOPAEDIC SURGERY
Payer: MEDICARE

## 2025-03-03 ENCOUNTER — ANESTHESIA (OUTPATIENT)
Dept: OPERATING ROOM | Age: 75
End: 2025-03-03
Payer: MEDICARE

## 2025-03-03 VITALS
SYSTOLIC BLOOD PRESSURE: 125 MMHG | HEART RATE: 93 BPM | RESPIRATION RATE: 16 BRPM | HEIGHT: 62 IN | TEMPERATURE: 97.5 F | WEIGHT: 150 LBS | DIASTOLIC BLOOD PRESSURE: 99 MMHG | OXYGEN SATURATION: 97 % | BODY MASS INDEX: 27.6 KG/M2

## 2025-03-03 PROBLEM — Z96.9 RETAINED ORTHOPEDIC HARDWARE: Status: ACTIVE | Noted: 2025-03-03

## 2025-03-03 PROCEDURE — 7100000001 HC PACU RECOVERY - ADDTL 15 MIN: Performed by: ORTHOPAEDIC SURGERY

## 2025-03-03 PROCEDURE — 2500000003 HC RX 250 WO HCPCS: Performed by: ORTHOPAEDIC SURGERY

## 2025-03-03 PROCEDURE — 2580000003 HC RX 258: Performed by: ORTHOPAEDIC SURGERY

## 2025-03-03 PROCEDURE — 7100000011 HC PHASE II RECOVERY - ADDTL 15 MIN: Performed by: ORTHOPAEDIC SURGERY

## 2025-03-03 PROCEDURE — 6360000002 HC RX W HCPCS: Performed by: ORTHOPAEDIC SURGERY

## 2025-03-03 PROCEDURE — 6360000002 HC RX W HCPCS: Performed by: ANESTHESIOLOGY

## 2025-03-03 PROCEDURE — 2709999900 HC NON-CHARGEABLE SUPPLY: Performed by: ORTHOPAEDIC SURGERY

## 2025-03-03 PROCEDURE — 2580000003 HC RX 258: Performed by: NURSE ANESTHETIST, CERTIFIED REGISTERED

## 2025-03-03 PROCEDURE — 3600000014 HC SURGERY LEVEL 4 ADDTL 15MIN: Performed by: ORTHOPAEDIC SURGERY

## 2025-03-03 PROCEDURE — 73600 X-RAY EXAM OF ANKLE: CPT

## 2025-03-03 PROCEDURE — 2580000003 HC RX 258: Performed by: ANESTHESIOLOGY

## 2025-03-03 PROCEDURE — 3700000000 HC ANESTHESIA ATTENDED CARE: Performed by: ORTHOPAEDIC SURGERY

## 2025-03-03 PROCEDURE — 6360000002 HC RX W HCPCS: Performed by: NURSE ANESTHETIST, CERTIFIED REGISTERED

## 2025-03-03 PROCEDURE — 3600000004 HC SURGERY LEVEL 4 BASE: Performed by: ORTHOPAEDIC SURGERY

## 2025-03-03 PROCEDURE — 7100000010 HC PHASE II RECOVERY - FIRST 15 MIN: Performed by: ORTHOPAEDIC SURGERY

## 2025-03-03 PROCEDURE — 7100000000 HC PACU RECOVERY - FIRST 15 MIN: Performed by: ORTHOPAEDIC SURGERY

## 2025-03-03 PROCEDURE — 3700000001 HC ADD 15 MINUTES (ANESTHESIA): Performed by: ORTHOPAEDIC SURGERY

## 2025-03-03 PROCEDURE — 20680 REMOVAL OF IMPLANT DEEP: CPT | Performed by: ORTHOPAEDIC SURGERY

## 2025-03-03 RX ORDER — SODIUM CHLORIDE 0.9 % (FLUSH) 0.9 %
5-40 SYRINGE (ML) INJECTION PRN
Status: DISCONTINUED | OUTPATIENT
Start: 2025-03-03 | End: 2025-03-03 | Stop reason: HOSPADM

## 2025-03-03 RX ORDER — SODIUM CHLORIDE 0.9 % (FLUSH) 0.9 %
5-40 SYRINGE (ML) INJECTION EVERY 12 HOURS SCHEDULED
Status: DISCONTINUED | OUTPATIENT
Start: 2025-03-03 | End: 2025-03-03 | Stop reason: HOSPADM

## 2025-03-03 RX ORDER — SODIUM CHLORIDE 9 MG/ML
INJECTION, SOLUTION INTRAVENOUS PRN
Status: DISCONTINUED | OUTPATIENT
Start: 2025-03-03 | End: 2025-03-03 | Stop reason: HOSPADM

## 2025-03-03 RX ORDER — BUPIVACAINE HYDROCHLORIDE 5 MG/ML
INJECTION, SOLUTION EPIDURAL; INTRACAUDAL
Status: COMPLETED | OUTPATIENT
Start: 2025-03-03 | End: 2025-03-03

## 2025-03-03 RX ORDER — NALOXONE HYDROCHLORIDE 0.4 MG/ML
INJECTION, SOLUTION INTRAMUSCULAR; INTRAVENOUS; SUBCUTANEOUS PRN
Status: DISCONTINUED | OUTPATIENT
Start: 2025-03-03 | End: 2025-03-03 | Stop reason: HOSPADM

## 2025-03-03 RX ORDER — ONDANSETRON 2 MG/ML
4 INJECTION INTRAMUSCULAR; INTRAVENOUS
Status: DISCONTINUED | OUTPATIENT
Start: 2025-03-03 | End: 2025-03-03 | Stop reason: HOSPADM

## 2025-03-03 RX ORDER — CEPHALEXIN 500 MG/1
500 CAPSULE ORAL 4 TIMES DAILY
Qty: 20 CAPSULE | Refills: 0 | Status: SHIPPED | OUTPATIENT
Start: 2025-03-03 | End: 2025-03-08

## 2025-03-03 RX ORDER — DEXAMETHASONE SODIUM PHOSPHATE 4 MG/ML
INJECTION, SOLUTION INTRA-ARTICULAR; INTRALESIONAL; INTRAMUSCULAR; INTRAVENOUS; SOFT TISSUE
Status: DISCONTINUED | OUTPATIENT
Start: 2025-03-03 | End: 2025-03-03 | Stop reason: SDUPTHER

## 2025-03-03 RX ORDER — GLYCOPYRROLATE 0.2 MG/ML
INJECTION INTRAMUSCULAR; INTRAVENOUS
Status: DISCONTINUED | OUTPATIENT
Start: 2025-03-03 | End: 2025-03-03 | Stop reason: SDUPTHER

## 2025-03-03 RX ORDER — LIDOCAINE HYDROCHLORIDE 20 MG/ML
INJECTION, SOLUTION EPIDURAL; INFILTRATION; INTRACAUDAL; PERINEURAL
Status: DISCONTINUED | OUTPATIENT
Start: 2025-03-03 | End: 2025-03-03 | Stop reason: SDUPTHER

## 2025-03-03 RX ORDER — MAGNESIUM HYDROXIDE 1200 MG/15ML
LIQUID ORAL CONTINUOUS PRN
Status: COMPLETED | OUTPATIENT
Start: 2025-03-03 | End: 2025-03-03

## 2025-03-03 RX ORDER — ONDANSETRON 2 MG/ML
INJECTION INTRAMUSCULAR; INTRAVENOUS
Status: DISCONTINUED | OUTPATIENT
Start: 2025-03-03 | End: 2025-03-03 | Stop reason: SDUPTHER

## 2025-03-03 RX ORDER — OXYCODONE HYDROCHLORIDE 10 MG/1
10 TABLET ORAL PRN
Status: DISCONTINUED | OUTPATIENT
Start: 2025-03-03 | End: 2025-03-03 | Stop reason: HOSPADM

## 2025-03-03 RX ORDER — PROPOFOL 10 MG/ML
INJECTION, EMULSION INTRAVENOUS
Status: DISCONTINUED | OUTPATIENT
Start: 2025-03-03 | End: 2025-03-03 | Stop reason: SDUPTHER

## 2025-03-03 RX ORDER — FENTANYL CITRATE 50 UG/ML
INJECTION, SOLUTION INTRAMUSCULAR; INTRAVENOUS
Status: DISCONTINUED | OUTPATIENT
Start: 2025-03-03 | End: 2025-03-03 | Stop reason: SDUPTHER

## 2025-03-03 RX ORDER — OXYCODONE HYDROCHLORIDE 5 MG/1
5 TABLET ORAL PRN
Status: DISCONTINUED | OUTPATIENT
Start: 2025-03-03 | End: 2025-03-03 | Stop reason: HOSPADM

## 2025-03-03 RX ORDER — SODIUM CHLORIDE 9 MG/ML
INJECTION, SOLUTION INTRAVENOUS
Status: DISCONTINUED | OUTPATIENT
Start: 2025-03-03 | End: 2025-03-03 | Stop reason: SDUPTHER

## 2025-03-03 RX ADMIN — HYDROMORPHONE HYDROCHLORIDE 0.5 MG: 1 INJECTION, SOLUTION INTRAMUSCULAR; INTRAVENOUS; SUBCUTANEOUS at 08:07

## 2025-03-03 RX ADMIN — ONDANSETRON 4 MG: 2 INJECTION, SOLUTION INTRAMUSCULAR; INTRAVENOUS at 07:33

## 2025-03-03 RX ADMIN — LIDOCAINE HYDROCHLORIDE 60 MG: 20 INJECTION, SOLUTION EPIDURAL; INFILTRATION; INTRACAUDAL; PERINEURAL at 07:29

## 2025-03-03 RX ADMIN — FENTANYL CITRATE 50 MCG: 50 INJECTION INTRAMUSCULAR; INTRAVENOUS at 07:25

## 2025-03-03 RX ADMIN — FENTANYL CITRATE 50 MCG: 50 INJECTION INTRAMUSCULAR; INTRAVENOUS at 07:31

## 2025-03-03 RX ADMIN — SODIUM CHLORIDE: 9 INJECTION, SOLUTION INTRAVENOUS at 07:24

## 2025-03-03 RX ADMIN — PROPOFOL 150 MG: 10 INJECTION, EMULSION INTRAVENOUS at 07:29

## 2025-03-03 RX ADMIN — GLYCOPYRROLATE 0.2 MG: 0.2 INJECTION INTRAMUSCULAR; INTRAVENOUS at 07:33

## 2025-03-03 RX ADMIN — SODIUM CHLORIDE: 0.9 INJECTION, SOLUTION INTRAVENOUS at 06:16

## 2025-03-03 RX ADMIN — DEXAMETHASONE SODIUM PHOSPHATE 8 MG: 4 INJECTION, SOLUTION INTRAMUSCULAR; INTRAVENOUS at 07:33

## 2025-03-03 RX ADMIN — SODIUM CHLORIDE 2000 MG: 9 INJECTION, SOLUTION INTRAVENOUS at 07:24

## 2025-03-03 ASSESSMENT — LIFESTYLE VARIABLES: SMOKING_STATUS: 0

## 2025-03-03 ASSESSMENT — PAIN DESCRIPTION - DESCRIPTORS
DESCRIPTORS: DISCOMFORT;DULL
DESCRIPTORS: BURNING

## 2025-03-03 ASSESSMENT — PAIN DESCRIPTION - FREQUENCY: FREQUENCY: CONTINUOUS

## 2025-03-03 ASSESSMENT — PAIN DESCRIPTION - PAIN TYPE: TYPE: SURGICAL PAIN

## 2025-03-03 ASSESSMENT — PAIN - FUNCTIONAL ASSESSMENT
PAIN_FUNCTIONAL_ASSESSMENT: PREVENTS OR INTERFERES SOME ACTIVE ACTIVITIES AND ADLS
PAIN_FUNCTIONAL_ASSESSMENT: 0-10
PAIN_FUNCTIONAL_ASSESSMENT: 0-10
PAIN_FUNCTIONAL_ASSESSMENT: PREVENTS OR INTERFERES SOME ACTIVE ACTIVITIES AND ADLS
PAIN_FUNCTIONAL_ASSESSMENT: 0-10

## 2025-03-03 ASSESSMENT — PAIN DESCRIPTION - ORIENTATION: ORIENTATION: RIGHT

## 2025-03-03 ASSESSMENT — PAIN SCALES - GENERAL
PAINLEVEL_OUTOF10: 0
PAINLEVEL_OUTOF10: 0
PAINLEVEL_OUTOF10: 5
PAINLEVEL_OUTOF10: 9
PAINLEVEL_OUTOF10: 2
PAINLEVEL_OUTOF10: 5

## 2025-03-03 ASSESSMENT — PAIN DESCRIPTION - LOCATION: LOCATION: ANKLE

## 2025-03-03 ASSESSMENT — PAIN DESCRIPTION - ONSET: ONSET: AWAKENED FROM SLEEP

## 2025-03-03 NOTE — ANESTHESIA PRE PROCEDURE
Lab Results   Component Value Date/Time    WBC 4.6 10/11/2024 05:58 AM    RBC 3.30 10/11/2024 05:58 AM    HGB 11.3 10/11/2024 05:58 AM    HCT 32.8 10/11/2024 05:58 AM    MCV 99.4 10/11/2024 05:58 AM    RDW 13.0 10/11/2024 05:58 AM     10/11/2024 05:58 AM       CMP:   Lab Results   Component Value Date/Time     10/11/2024 05:58 AM    K 4.1 10/11/2024 05:58 AM    K 3.8 10/08/2024 10:41 AM     10/11/2024 05:58 AM    CO2 26 10/11/2024 05:58 AM    BUN 13 10/11/2024 05:58 AM    CREATININE 0.5 10/11/2024 05:58 AM    AGRATIO 1.6 10/06/2024 02:13 AM    LABGLOM >90 10/11/2024 05:58 AM    GLUCOSE 87 10/11/2024 05:58 AM    CALCIUM 9.3 10/11/2024 05:58 AM    BILITOT <0.2 10/06/2024 02:13 AM    ALKPHOS 74 10/06/2024 02:13 AM    AST 27 10/06/2024 02:13 AM    ALT 18 10/06/2024 02:13 AM       POC Tests: No results for input(s): \"POCGLU\", \"POCNA\", \"POCK\", \"POCCL\", \"POCBUN\", \"POCHEMO\", \"POCHCT\" in the last 72 hours.    Coags: No results found for: \"PROTIME\", \"INR\", \"APTT\"    HCG (If Applicable): No results found for: \"PREGTESTUR\", \"PREGSERUM\", \"HCG\", \"HCGQUANT\"     ABGs: No results found for: \"PHART\", \"PO2ART\", \"ZXF2HJM\", \"NAR9LFI\", \"BEART\", \"G4FQZCLN\"     Type & Screen (If Applicable):  No results found for: \"LABABO\"    Drug/Infectious Status (If Applicable):  No results found for: \"HIV\", \"HEPCAB\"    COVID-19 Screening (If Applicable): No results found for: \"COVID19\"        Anesthesia Evaluation  Patient summary reviewed   no history of anesthetic complications:   Airway: Mallampati: III  TM distance: >3 FB   Neck ROM: full  Mouth opening: > = 3 FB   Dental:          Pulmonary:Negative Pulmonary ROS       (-) asthma, recent URI, sleep apnea and not a current smoker                           Cardiovascular:  Exercise tolerance: good (>4 METS)  (+) hypertension:    (-) CAD and  CHF       Beta Blocker:  Not on Beta Blocker        PE comment: -105. No symptoms. Appears sinus tachy   Neuro/Psych:   (+)

## 2025-03-03 NOTE — PROGRESS NOTES
PT received into room 5 from PACU. Report obtained. Vss. Pt stable. PO provided. Dressing c/d/I. Assisted up to bathroom. Up in recliner.  to room.

## 2025-03-03 NOTE — BRIEF OP NOTE
Brief Postoperative Note      Patient: Little Conrad  YOB: 1950  MRN: [id number]    Date of Procedure: 3/3/2025    Pre-Op Diagnosis Codes:      * Painful orthopaedic hardware [T84.84XA]    Post-Op Diagnosis: Same       Procedure(s):  RIGHT ANKLE SYDESMOSIS SCREW REMOVAL TIMES TWO    Surgeon(s):  Delisa Landis MD    Assistant: DINA Cramer    Anesthesia: General    Estimated Blood Loss (mL): Minimal    Complications: None    Specimens:   * No specimens in log *    Implants:  Implant Name Type Inv. Item Serial No.  Lot No. LRB No. Used Action   SCREW BNE L48MM DIA3.5MM STEVE PERIARTC S STL ST - [id number]  SCREW BNE L48MM DIA3.5MM STEVE PERIARTC S STL ST  [employer]WD  Right 1 Explanted   SCREW BNE L44MM DIA3.5MM STEVE S STL ST NONCANNULATED IM - [id number]  SCREW BNE L44MM DIA3.5MM STEVE S STL ST NONCANNULATED IM  [employer]-WD  Right 1 Explanted         Drains: * No LDAs found *    Findings:  Infection Present At Time Of Surgery (PATOS) (choose all levels that have infection present):  No infection present  Other Findings: Same.    Electronically signed by Delisa Landis MD on 3/3/2025 at 8:08 AM

## 2025-03-03 NOTE — ANESTHESIA POSTPROCEDURE EVALUATION
Pulse:(!) 110, Resp:18, SpO2:97 %     LABS:    CBC  Lab Results       Component                Value               Date/Time                  WBC                      4.6                 10/11/2024 05:58 AM        HGB                      11.3 (L)            10/11/2024 05:58 AM        HCT                      32.8 (L)            10/11/2024 05:58 AM        PLT                      201                 10/11/2024 05:58 AM   RENAL  Lab Results       Component                Value               Date/Time                  NA                       137                 10/11/2024 05:58 AM        K                        4.1                 10/11/2024 05:58 AM        K                        3.8                 10/08/2024 10:41 AM        CL                       102                 10/11/2024 05:58 AM        CO2                      26                  10/11/2024 05:58 AM        BUN                      13                  10/11/2024 05:58 AM        CREATININE               0.5 (L)             10/11/2024 05:58 AM        GLUCOSE                  87                  10/11/2024 05:58 AM   COAGS  No results found for: \"PROTIME\", \"INR\", \"APTT\"    Intake & Output:  @24HRIO@    Nausea & Vomiting:  No    Level of Consciousness:  Awake    Pain Assessment:  Adequate analgesia    Anesthesia Complications:  No apparent anesthetic complications    SUMMARY      Vital signs stable  OK to discharge from Stage I post anesthesia care.  Care transferred from Anesthesiology department on discharge from perioperative area   Pain management: satisfactory to patient    No notable events documented.

## 2025-03-03 NOTE — FLOWSHEET NOTE
Pt to PACU from OR, Pt is resting comfortably with stable vital signs on nasal canula oxygen.      03/03/25 0750   Vital Signs   Temp 98.8 °F (37.1 °C)   Pulse 95   Respirations 13   BP (!) 121/58   MAP (Calculated) 79   MAP (mmHg) 72   Pain Assessment   Pain Assessment Adult Nonverbal Pain Scale (NPVS)   Pain Level 0   Adult Nonverbal Pain Scale (NVPS)   Face 0   Activity (Movement) 0   Guarding 0   Physiology (Vital Signs) 0   Respiratory 0   NVPS Score  0   Oxygen Therapy   SpO2 100 %   O2 Device Nasal cannula   Rhythm Interpretation   Cardiac Rhythm Sinus rhythm

## 2025-03-03 NOTE — PROGRESS NOTES
CLINICAL PHARMACY NOTE: MEDS TO BEDS    Total # of Prescriptions Filled: 1   The following medications were delivered to the patient:  Discharge Medication List as of 3/3/2025  9:19 AM        START taking these medications    Details   cephALEXin (KEFLEX) 500 MG capsule Take 1 capsule by mouth 4 times daily for 5 days, Disp-20 capsule, R-0Normal               Additional Documentation:left  meds with family in room

## 2025-03-03 NOTE — DISCHARGE INSTRUCTIONS
Post op instruction:  1- D/C home  2- Dx Right ankle painful medial screws.  3- WBAT right ankle  4- Elevation surgical site, with ice  5- Keep Drsg dry and clean, 3 days, then BandAid.  6- F/U in 2 weeks.       Delisa Landis MD, 3/3/2025

## 2025-03-03 NOTE — OP NOTE
[facility]          [address] [place] [address]                            OPERATIVE REPORT      PATIENT NAME: FREYA BERNARD           : 1950  MED REC NO: [id number]                      ROOM: OR  ACCOUNT NO: [id number]                       ADMIT DATE: 2025  PROVIDER: Delisa Landis MD      DATE OF PROCEDURE:  2025    SURGEON:  Delisa Landis MD    ASSISTANT:  Deandra Cramer CNP.    PREOPERATIVE DIAGNOSIS:  Right ankle retained deep implant - 2 syndesmosis screws.    POSTOPERATIVE DIAGNOSIS:  Right ankle retained deep implant - 2 syndesmosis screws.    PROCEDURE DONE:  Removal of deep implant right ankle 2 syndesmosis screws.    ANESTHESIA:  General anesthesia.    ESTIMATED BLOOD LOSS:  Minimal.    COMPLICATIONS:  None.    TOURNIQUET:  Right upper calf, 250 mmHg.    INDICATIONS:  This is a 74-year-old white female who had a right ankle unstable fracture dislocation with syndesmosis disruption.  She had surgery on 10/07/2024.  She is scheduled for staged procedure for right ankle syndesmosis screw removal x2.  All risks, benefits, and other discussed with the patient.  She agreed to proceed with surgical removal.    DESCRIPTION OF PROCEDURE:  The patient's right ankle was marked.  She received 2 g Ancef IV preoperatively.  The patient was then brought to the operating room, underwent general anesthesia.  A well-padded tourniquet was placed on right upper calf.  The right lower extremity was then prepped and draped in regular sterile routine fashion.  A time-out was called confirming the patient's name, site, and procedure.    Esmarch was used for exsanguination and tourniquet was inflated to 250 mmHg.  An incision was made over the screw area.  Careful dissection was performed.  We first exposed the proximal of the two.  Was slightly loose, was significantly challenging, but we used a Delano, we were able to engage it and we were able

## 2025-03-03 NOTE — H&P
Preoperative H&P Update    The patient's History and Physical in the medical record from 2/26/2025 was reviewed by me today.    Past Medical History:   Diagnosis Date    Acid reflux     Depression     GERD (gastroesophageal reflux disease)     Hypertension     IBS (irritable bowel syndrome)     Insomnia     Panic attacks     Tardive dyskinesia      Past Surgical History:   Procedure Laterality Date    ANKLE FRACTURE SURGERY Right 10/07/2024    OPEN REDUCTION INTERNAL FIXATION RIGHT ANKLE FRACTURE DISLOCATION performed by Delisa Landis MD at [facility] OR    BACK SURGERY      cervical laminectomy    BREAST SURGERY Left     lumpectomy    COLONOSCOPY N/A 10/18/2023    COLONOSCOPY POLYPECTOMY SNARE/COLD BIOPSY performed by Jyotsna Quintana MD at [facility]    FOOT SURGERY Left     tarsal tunnel release    HYSTERECTOMY (CERVIX STATUS UNKNOWN)      partial- ovaries intact    SHOULDER SURGERY Right     rotator cuff    UPPER GASTROINTESTINAL ENDOSCOPY N/A 10/18/2023    EGD BIOPSY performed by Jyotsna Quintana MD at [facility]     No current facility-administered medications on file prior to encounter.     Current Outpatient Medications on File Prior to Encounter   Medication Sig Dispense Refill    ascorbic acid (VITAMIN C) 500 MG tablet Take 1 tablet by mouth daily      Fesoterodine Fumarate ER 8 MG TB24 Take 1 tablet by mouth daily      Turmeric 500 MG TABS Take 1,000 mg by mouth daily      gabapentin (NEURONTIN) 300 MG capsule Take 1 capsule by mouth nightly.      zolpidem (AMBIEN) 10 MG tablet Take 1 tablet by mouth nightly as needed for Sleep.      calcium carbonate (OSCAL) 500 MG TABS tablet Take 1 tablet by mouth daily      busPIRone (BUSPAR) 10 MG tablet Take 1 tablet by mouth 2 times daily      Flaxseed, Linseed, (FLAX SEED OIL) 1000 MG CAPS Take by mouth daily      irbesartan (AVAPRO) 150 MG tablet Take 1 tablet by mouth daily      melatonin 5 MG TABS tablet Take 1 tablet by mouth nightly as needed

## 2025-03-19 ENCOUNTER — OFFICE VISIT (OUTPATIENT)
Dept: ORTHOPEDIC SURGERY | Age: 75
End: 2025-03-19

## 2025-03-19 DIAGNOSIS — S93.431A SYNDESMOTIC DISRUPTION OF RIGHT ANKLE, INITIAL ENCOUNTER: ICD-10-CM

## 2025-03-19 DIAGNOSIS — S82.891A CLOSED FRACTURE DISLOCATION OF RIGHT ANKLE, INITIAL ENCOUNTER: Primary | ICD-10-CM

## 2025-03-19 DIAGNOSIS — T84.84XA PAINFUL ORTHOPAEDIC HARDWARE: ICD-10-CM

## 2025-03-19 PROCEDURE — 99024 POSTOP FOLLOW-UP VISIT: CPT | Performed by: NURSE PRACTITIONER

## 2025-03-19 NOTE — PROGRESS NOTES
DIAGNOSIS:  Right ankle hardware removal, 2 syndesmosis screws.    DATE OF SURGERY:  3/3/2025.    HISTORY OF PRESENT ILLNESS:  Ms. Conrad 74 y.o.  female who came in today for 2 weeks postoperative visit.  The patient denies any significant pain in the right ankle. Rates her pain a 0/10 VAS and much improved. She has been WBAT.  No numbness or tingling sensation. No fever or Chills.     PHYSICAL EXAMINATION:  The incision healing well .  No signs of any erythema or drainage, minimal swelling. She has no pain with the active or passive range of motion of the right ankle, decreased ROM.  She has intact sensation distally, and she is neurovascularly intact.    IMAGING:  Three views right ankle taken today in the office showed hardware syndesmosis screw removal, no fracture. Remaining hardware intact.     IMPRESSION:  2 weeks out from right ankle syndesmosis screw removal and doing very well.    PLAN: She can go back to normal activity with no heavy impact activities for 6 weeks. I discussed with the patient that I think that she would really benefit from a course of physical therapy for further strengthening and stretching. An Rx for physical therapy was given to the patient.  The patient will come back for a follow up in 3 months.  At that time, we will take 3 views of the right ankle.      CONI Young - CNP

## 2025-03-24 NOTE — PLAN OF CARE
as able. Patient would benefit from continued skilled physical therapy to improve lower extremity strength and stability in order to decrease pain and improve tolerance to ambulation activities.     Medical Necessity Documentation:  I certify that this patient meets the below criteria necessary for medical necessity for care and/or justification of therapy services:  The patient has functional impairments and/or activity limitations and would benefit from continued outpatient therapy services to address the deficits outlined in the patients goals  The patient has a musculoskeletal condition(s) with a corresponding ICD-10 code that is of complexity and severity that require skilled therapeutic intervention. This has a direct and significant impact on the need for therapy and significantly impacts the rate of recovery.     Return to Play: NA    Prognosis for POC: [x] Good [] Fair  [] Poor    Patient requires continued skilled intervention: [x] Yes  [] No      CHARGE CAPTURE     PT CHARGE GRID   CPT Code (TIMED) minutes # CPT Code (UNTIMED) #     Therex (15214)  11 1  EVAL:LOW (69507 - Typically 20 minutes face-to-face)     Neuromusc. Re-ed (79224) 19 1  Re-Eval (69513)     Manual (20883)    Estim Unattended (15005)     Ther. Act (98724) 10 1  [facility]. Traction (60680)     Gait (87517)    Dry Needle 1-2 muscle (14113)     Aquatic Therex (90664)    Dry Needle 3+ muscle (76743)     Iontophoresis (80470)    VASO (82113)     Ultrasound (48928)    Group Therapy (18107)     Estim Attended (70249)    Canalith Repositioning (18478)     Physical Performance Test (78329)    Custom orthotic ()     Other:    Other:    Total Timed Code Tx Minutes 40 3        Total Treatment Minutes 40        Charge Justification:  (47845) THERAPEUTIC EXERCISE - Provided verbal/tactile cueing for HEP and/or activities related to strengthening, flexibility, endurance, ROM performed to prevent loss of range of motion, maintain or improve muscular

## 2025-03-27 ENCOUNTER — HOSPITAL ENCOUNTER (OUTPATIENT)
Dept: PHYSICAL THERAPY | Age: 75
Setting detail: THERAPIES SERIES
Discharge: HOME | End: 2025-03-27
Attending: ORTHOPAEDIC SURGERY
Payer: MEDICARE

## 2025-03-27 PROCEDURE — 97112 NEUROMUSCULAR REEDUCATION: CPT

## 2025-03-27 PROCEDURE — 97530 THERAPEUTIC ACTIVITIES: CPT

## 2025-03-27 PROCEDURE — 97110 THERAPEUTIC EXERCISES: CPT

## 2025-03-31 ENCOUNTER — HOSPITAL ENCOUNTER (OUTPATIENT)
Dept: PHYSICAL THERAPY | Age: 75
Setting detail: THERAPIES SERIES
Discharge: HOME | End: 2025-03-31
Attending: ORTHOPAEDIC SURGERY
Payer: MEDICARE

## 2025-03-31 PROCEDURE — 97110 THERAPEUTIC EXERCISES: CPT

## 2025-03-31 PROCEDURE — 97112 NEUROMUSCULAR REEDUCATION: CPT

## 2025-03-31 NOTE — FLOWSHEET NOTE
[facility] [place]- Outpatient Rehabilitation and Therapy [address], [address], [place] [address] office: [phone] fax: [phone]        Physical Therapy: TREATMENT/PROGRESS NOTE   Patient: Little Conrad (74 y.o. female)   Examination Date: 2025   :  1950 MRN: [id number]   Visit #: 14   Insurance Allowable Auth Needed   AETNA MEDICARE  BMN []Yes    [x]No    Insurance: Payor: AETNA MEDICARE / Plan: AETNA MEDICARE-ADVANTAGE PPO / Product Type: Medicare /   Insurance ID: [id number] - (Medicare Managed)  Secondary Insurance (if applicable):    Treatment Diagnosis:     ICD-10-CM    1. Decreased functional mobility  R26.89       2. Decreased activity tolerance  R68.89       3. Pain aggravated by standing  R52       4. Functional weakness  R53.1       5. Right ankle pain, unspecified chronicity  M25.571       6. Need for home exercise program  Z78.9          Medical Diagnosis:  Closed fracture dislocation of right ankle, initial encounter [S82.891A]  Syndesmotic disruption of right ankle, initial encounter [S93.431A]   Referring Physician: Deandra Cramer, APRN -*  PCP: Fredis Smiley MD     Plan of care signed (Y/N): YES    Date of Patient follow up with Physician:      Progress Report/POC: NO  Progress note due: 2025, 25, 25  POC update due: 3/11/25, 25                                            Medical History:  Comorbidities:  Hypertension  Anxiety  Depression  Other: acid reflux, GERD, IBS, insomnia, panic attacks, tardive dyskinesia                                         Precautions/ Contra-indications:           Latex allergy:  NO  Pacemaker:    NO  Contraindications for Manipulation: NA  Date of Surgery: 10/7/24  Other:    Red Flags:  None    Suicide Screening:   The patient did not verbalize a primary behavioral concern, suicidal ideation, suicidal intent, or demonstrate suicidal behaviors.    Preferred Language for Healthcare:   [x] English       []

## 2025-04-03 ENCOUNTER — HOSPITAL ENCOUNTER (OUTPATIENT)
Dept: PHYSICAL THERAPY | Age: 75
Setting detail: THERAPIES SERIES
Discharge: HOME | End: 2025-04-03
Attending: ORTHOPAEDIC SURGERY
Payer: MEDICARE

## 2025-04-03 PROCEDURE — 97112 NEUROMUSCULAR REEDUCATION: CPT

## 2025-04-03 PROCEDURE — 97110 THERAPEUTIC EXERCISES: CPT

## 2025-04-03 NOTE — FLOWSHEET NOTE
[facility]- Outpatient Rehabilitation and Therapy [address], [address], [place] [address] office: [phone] fax: [phone]        Physical Therapy: TREATMENT/PROGRESS NOTE   Patient: Little Conrad (74 y.o. female)   Examination Date: 2025   :  1950 MRN: [id number]   Visit #: 15   Insurance Allowable Auth Needed   AETNA MEDICARE  BMN []Yes    [x]No    Insurance: Payor: AETNA MEDICARE / Plan: AETNA MEDICARE-ADVANTAGE PPO / Product Type: Medicare /   Insurance ID: [id number] - (Medicare Managed)  Secondary Insurance (if applicable):    Treatment Diagnosis:     ICD-10-CM    1. Decreased functional mobility  R26.89       2. Decreased activity tolerance  R68.89       3. Pain aggravated by standing  R52       4. Functional weakness  R53.1       5. Right ankle pain, unspecified chronicity  M25.571       6. Need for home exercise program  Z78.9          Medical Diagnosis:  Closed fracture dislocation of right ankle, initial encounter [S82.891A]  Syndesmotic disruption of right ankle, initial encounter [S93.431A]   Referring Physician: Deandra Cramer, APRN -*  PCP: Fredis Smiley MD     Plan of care signed (Y/N): YES    Date of Patient follow up with Physician:      Progress Report/POC: NO  Progress note due: 2025, 25, 25  POC update due: 3/11/25, 25                                            Medical History:  Comorbidities:  Hypertension  Anxiety  Depression  Other: acid reflux, GERD, IBS, insomnia, panic attacks, tardive dyskinesia                                         Precautions/ Contra-indications:           Latex allergy:  NO  Pacemaker:    NO  Contraindications for Manipulation: NA  Date of Surgery: 10/7/24  Other:    Red Flags:  None    Suicide Screening:   The patient did not verbalize a primary behavioral concern, suicidal ideation, suicidal intent, or demonstrate suicidal behaviors.    Preferred Language for Healthcare:   [x] English       []

## 2025-04-07 ENCOUNTER — HOSPITAL ENCOUNTER (OUTPATIENT)
Dept: PHYSICAL THERAPY | Age: 75
Setting detail: THERAPIES SERIES
Discharge: HOME | End: 2025-04-07
Attending: ORTHOPAEDIC SURGERY
Payer: MEDICARE

## 2025-04-07 PROCEDURE — 97112 NEUROMUSCULAR REEDUCATION: CPT

## 2025-04-07 PROCEDURE — 97110 THERAPEUTIC EXERCISES: CPT

## 2025-04-07 NOTE — FLOWSHEET NOTE
ankle Blue  20 each     Gastroc towel stretch  30 sec 3     Wobble board fwd/back and lateral  2 min each     x SL balance  30 sec 3 Frequent taps to bars for balance but patient is able to maintain for short bursts of time    Tandem stance  1 min bilat  Occasional taps to bars for balance    Semi-tandem airex  1 min bilat     x Tandem airex  1 min bilat  Moderate pressure on bar for support    Semi-tandem eyes closed  1 min bilat     x Marches on airex  2 min     x Tandem eyes closed  1 min bilat      Standing marches  2 min      BAPS board - fwd/back, lateral, CW, CCW Level 2 2 min each  Most difficulty with fwd/back motion   x Toe taps to cones  2 10 bilat CGA of therapist, no UE assist    Stepping up and over cones fwd 5 cones 1 laps  Good control, no near LOB   x Wedge calf stretch  30 sec 3     Hamstring stretch  30 sec 3     BIODEX balance motor control  41% accuracy in 57 seconds      BIODEX balance maze control (skill level - easy)  63% accuracy in 1 min 31 seconds      Ankle alphabet   1     Half kneel to stand from 6 inch step with blue airex on top   10 bilat With UE assist    Tests/measures as above       x Patient education - importance of progressing self at home, ambulating more at home without cane but still bringing cane along if needed with fatigue                Therapeutic Activity (77288)  Sets/time              Subjective history taking, tests/measures as above, discussed how screw removal could lead to instability due to muscle weakness, discussed importance of continuing HEP at home including band exercises as below                                 Modalities:    No modalities applied this session    Education/Home Exercise Program: Patient HEP program created electronically.  Refer to Zi[employer] access code: Access Code: [email or web address]  Access Code: [email or web address]  URL: [email or web address]  Date: 04/07/2025  Prepared by: Joanne Najera    Exercises  - Long Sitting Ankle Eversion with Resistance  -

## 2025-04-08 ENCOUNTER — APPOINTMENT (OUTPATIENT)
Dept: PHYSICAL THERAPY | Age: 75
End: 2025-04-08
Attending: ORTHOPAEDIC SURGERY
Payer: MEDICARE

## 2025-04-10 ENCOUNTER — APPOINTMENT (OUTPATIENT)
Dept: PHYSICAL THERAPY | Age: 75
End: 2025-04-10
Attending: ORTHOPAEDIC SURGERY
Payer: MEDICARE

## 2025-04-14 ENCOUNTER — HOSPITAL ENCOUNTER (OUTPATIENT)
Dept: PHYSICAL THERAPY | Age: 75
Setting detail: THERAPIES SERIES
Discharge: HOME | End: 2025-04-14
Attending: ORTHOPAEDIC SURGERY
Payer: MEDICARE

## 2025-04-14 PROCEDURE — 97112 NEUROMUSCULAR REEDUCATION: CPT

## 2025-04-14 PROCEDURE — 97110 THERAPEUTIC EXERCISES: CPT

## 2025-04-14 NOTE — FLOWSHEET NOTE
ankle the other day, so it was a little sore for a bit. No lasting pain.   4/14/25: Patient reports that her foot/ankle is feeling good. She's not having any pain anymore, only some feelings of instability. Patient reports that she has been carrying the cane more with walking and has been doing fine. Patient reports that she feels her strength is better than before her initial injury, but her balance is still not back to where it was before.     OBJECTIVE EXAMINATION     12/31/2024  Gait:    Pattern: antalgic pattern, decreased step length, forward flexed posture, and shuffles  Assistive Device Used: Single point cane on right  Balance:  [] WNL      [] NT       [x] Dysfunction noted - see above  Comment:   Falls Risk Assessment (30 days):   Falls Risk assessed and patient requires intervention due to being higher risk   Time Up and Go (TUG):   Not Assessed - fall risk due to recent surgery and AD use    3/27/25  Incision: well-healing incision, no unusual redness, no drainage     Test used Initial score  12/31/2024 1/28/25  Re-eval 02/25/2025  Discharge 03/27/2025  Re-eval s/p screw removal 04/14/2025   Pain Summary VAS 2-4/10 Sore, does get some nerve pain still but no pain with activities Some nerve pain at night, no pain with exercises 1-2/10    Functional questionnaire FAAM - ADL section 33/84 49/84 53/84 41/84    Other:          ROM  L R        Ankle dorsiflexion  2 deg 7 deg 5 deg 0 deg             Strength  L R        Dorsiflexion  4/5 4/5 4/5 4/5     Plantarflexion  4-/5 4+/5 4+/5 4+/5     Inversion  4-/5 4-/5 4-/5 3+/5, pain     Eversion  4-/5 4/5 4/5 4-/5    Balance          Narrow GERMAN WNL        SLS  NT Requires light assist on bar Light fingertip touch on bars Min A to Mod A bilaterally                     Exercises/Interventions     Completed 04/14/2025  Therapeutic Ex (03838)  resistance Sets/time Reps Notes/Cues/Progressions   x Nustep Level 6 5 min      Leg press 120 lbs 2 15    x Step-ups 8 inch 2

## 2025-04-21 ENCOUNTER — HOSPITAL ENCOUNTER (OUTPATIENT)
Dept: PHYSICAL THERAPY | Age: 75
Setting detail: THERAPIES SERIES
Discharge: HOME | End: 2025-04-21
Attending: ORTHOPAEDIC SURGERY
Payer: MEDICARE

## 2025-04-21 PROCEDURE — 97112 NEUROMUSCULAR REEDUCATION: CPT

## 2025-04-21 PROCEDURE — 97110 THERAPEUTIC EXERCISES: CPT

## 2025-04-21 NOTE — PLAN OF CARE
[facility]- Outpatient Rehabilitation and Therapy [address], [place] [address] office: [phone] fax: [phone]     Physical Therapy Discharge Summary    Dear CONI Young - *   ,    We had the pleasure of treating the following patient for physical therapy services at [facility] Outpatient Physical Therapy.  A summary of our findings can be found in the discharge summary below.  If you have any questions or concerns regarding these findings, please do not hesitate to contact me at the office phone number checked above.  Thank you for the referral.     Physician Signature:_______________________________Date:__________________  By signing above (or electronic signature), therapist’s plan is approved by physician     Total Visits: 18     Recommendation:   [] Hold PT, pending MD visit   [x] Discharge to [facility]. Follow up with PT or MD PRN.     Reason for Discharge:  Patient should continue to improve in reasonable time if they continue HEP    Today's Assessment: Patient has met 2/3 short term goals and 2/5 long term goals. Patient is progressing in all other goal areas. Patient demonstrates improving functional mobility through ability to perform half kneel to stand transfer with unilateral UE assist this date. Improving ankle dorsiflexion ROM noted, able to reach 5 degrees this date. Patient demonstrates improving SL balance, with decreased assist needed this date to maintain. Patient demonstrates independence with HEP this date in clinic and should continue to improve with compliance with HEP. Patient is appropriate for discharge to [facility] at this time.       Physical Therapy: TREATMENT/PROGRESS NOTE   Patient: Little Conrad (74 y.o. female)   Examination Date: 2025   :  1950 MRN: [id number]   Visit #: 18   Insurance Allowable Auth Needed   AETNA MEDICARE  BMN []Yes    [x]No    [place]urance[place] / Plan: AETNA MEDICARE-ADVANTAGE PPO /

## 2025-06-18 ENCOUNTER — OFFICE VISIT (OUTPATIENT)
Dept: ORTHOPEDIC SURGERY | Age: 75
End: 2025-06-18
Payer: MEDICARE

## 2025-06-18 VITALS — WEIGHT: 150 LBS | BODY MASS INDEX: 28.32 KG/M2 | HEIGHT: 61 IN

## 2025-06-18 DIAGNOSIS — S82.891A CLOSED FRACTURE DISLOCATION OF RIGHT ANKLE, INITIAL ENCOUNTER: Primary | ICD-10-CM

## 2025-06-18 PROCEDURE — 99213 OFFICE O/P EST LOW 20 MIN: CPT | Performed by: NURSE PRACTITIONER

## 2025-06-18 PROCEDURE — 1123F ACP DISCUSS/DSCN MKR DOCD: CPT | Performed by: NURSE PRACTITIONER

## 2025-06-18 PROCEDURE — 1159F MED LIST DOCD IN RCRD: CPT | Performed by: NURSE PRACTITIONER

## 2025-06-18 PROCEDURE — 1126F AMNT PAIN NOTED NONE PRSNT: CPT | Performed by: NURSE PRACTITIONER

## 2025-06-18 NOTE — PROGRESS NOTES
DIAGNOSIS:    1-Right ankle unstable fracture dislocation, status post ORIF.  2-Right ankle distal tibia fibular syndesmosis disruption, s/p ORIF 2 syndesmosis screws, s/p screw removal on 3/3/2025    DATE OF SURGERY:  10/7/2024.    HISTORY OF PRESENT ILLNESS: Little Conrad 74 y.o.  female who came in today for 8 months postoperative visit.  The patient denies any significant pain in the right ankle. Rates pain a 2/10 VAS mild, aching, intermittent and are improving. Aggravating factors movement. Alleviating factors rest. She has been and WB. Still has intermittent swelling.  No numbness or tingling sensation. No fever or Chills. She is  done with PT.     Past Medical History:   Diagnosis Date    Acid reflux     Depression     GERD (gastroesophageal reflux disease)     Hypertension     IBS (irritable bowel syndrome)     Insomnia     Panic attacks     Tardive dyskinesia      Past Surgical History:   Procedure Laterality Date    ANKLE FRACTURE SURGERY Right 10/07/2024    OPEN REDUCTION INTERNAL FIXATION RIGHT ANKLE FRACTURE DISLOCATION performed by Delisa Landis MD at [facility] OR    ANKLE SURGERY Right 3/3/2025    RIGHT ANKLE SYDESMOSIS SCREW REMOVAL TIMES TWO performed by Delisa Landis MD at [facility] OR    BACK SURGERY      cervical laminectomy    BREAST SURGERY Left     lumpectomy    COLONOSCOPY N/A 10/18/2023    COLONOSCOPY POLYPECTOMY SNARE/COLD BIOPSY performed by Jyotsna Quintana MD at [facility] ENDOSCOPY    FOOT SURGERY Left     tarsal tunnel release    HYSTERECTOMY (CERVIX STATUS UNKNOWN)      partial- ovaries intact    SHOULDER SURGERY Right     rotator cuff    UPPER GASTROINTESTINAL ENDOSCOPY N/A 10/18/2023    EGD BIOPSY performed by Jyotsna Quintana MD at [facility] ENDOSCOPY     Family History   Problem Relation Age of Onset    Breast Cancer Mother     Alzheimer's Disease Father      Social History     Socioeconomic History    Marital status:      Spouse name: Not on file    Number of children:

## (undated) DEVICE — 3M™ STERI-DRAPE™ U-DRAPE 1015: Brand: STERI-DRAPE™

## (undated) DEVICE — ELECTRODE PT RET AD L9FT HI MOIST COND ADH HYDRGEL CORDED

## (undated) DEVICE — C-ARM: Brand: UNBRANDED

## (undated) DEVICE — BANDAGE COMPR W4INXL10YD WHITE/BEIGE E MTRX HK LOOP CLSR

## (undated) DEVICE — SYRINGE IRRIG 60ML SFT PLIABLE BLB EZ TO GRP 1 HND USE W/

## (undated) DEVICE — SPONGE GZ W4XL4IN COT 12 PLY TYP VII WVN C FLD DSGN STERILE

## (undated) DEVICE — MASTISOL ADHESIVE LIQ 2/3ML

## (undated) DEVICE — GOWN SIRUS NONREIN XL W/TWL: Brand: MEDLINE INDUSTRIES, INC.

## (undated) DEVICE — LOWER EXTREMITY: Brand: MEDLINE INDUSTRIES, INC.

## (undated) DEVICE — BIT DRL DIA2MM STD QUIK CONN FOR PERIARTC LOK PLT SYS

## (undated) DEVICE — SUTURE MONOCRYL + SZ 4-0 L18IN ABSRB UD L19MM PS-2 3/8 CIR MCP496G

## (undated) DEVICE — MINOR SET UP: Brand: MEDLINE INDUSTRIES, INC.

## (undated) DEVICE — SNARE ENDOSCP POLYP 2.4 MM 240 CM 10 MM 2.8 MM CAPTIVATOR

## (undated) DEVICE — GLOVE SURG 9 PF CRM STRL SENSICARE PI MIC LF

## (undated) DEVICE — TRANSFER SET 3": Brand: MEDLINE INDUSTRIES, INC.

## (undated) DEVICE — T-DRAPE,EXTREMITY,STERILE: Brand: MEDLINE

## (undated) DEVICE — BANDAGE COMPR W4INXL12FT E DISP ESMARCH EVEN

## (undated) DEVICE — GLOVE SURG SZ 65 THK91MIL LTX FREE SYN POLYISOPRENE

## (undated) DEVICE — DRESSING,GAUZE,XEROFORM,CURAD,1"X8",ST: Brand: CURAD

## (undated) DEVICE — BIT DRL L100MM DIA2.5MM FOR SM FRAG UNIV LOK SYS

## (undated) DEVICE — GLOVE SURG SZ 65 L12IN FNGR THK79MIL GRN LTX FREE

## (undated) DEVICE — MERCY HEALTH WEST TURNOVER: Brand: MEDLINE INDUSTRIES, INC.

## (undated) DEVICE — DECANTER BAG 9": Brand: MEDLINE INDUSTRIES, INC.

## (undated) DEVICE — FORCEPS BX 240CM 2.4MM L NDL RAD JAW 4 M00513334

## (undated) DEVICE — DRAPE,REIN 53X77,STERILE: Brand: MEDLINE

## (undated) DEVICE — TUBING, SUCTION, 1/4" X 12', STRAIGHT: Brand: MEDLINE

## (undated) DEVICE — SUTURE VICRYL + SZ 3-0 L18IN ABSRB UD SH 1/2 CIR TAPERCUT NDL VCP864D

## (undated) DEVICE — C-ARM PACK: Brand: C-ARM COVER

## (undated) DEVICE — SYRINGE MED 10ML LUERLOCK TIP W/O SFTY DISP

## (undated) DEVICE — BANDAGE COMPR W6INXL12FT SMOOTH FOR LIMB EXSANG ESMARCH

## (undated) DEVICE — SUTURE MONOCRYL + SZ 4-0 L27IN ABSRB UD L19MM PS-2 3/8 CIR MCP426H

## (undated) DEVICE — BANDAGE COMPR W4INXL15FT BGE E SGL LAYERED CLP CLSR

## (undated) DEVICE — STRIP,CLOSURE,WOUND,MEDI-STRIP,1/2X4: Brand: MEDLINE

## (undated) DEVICE — INTENDED FOR TISSUE SEPARATION, AND OTHER PROCEDURES THAT REQUIRE A SHARP SURGICAL BLADE TO PUNCTURE OR CUT.: Brand: BARD-PARKER ® STAINLESS STEEL BLADES

## (undated) DEVICE — NEEDLE HYPO 23GA L1.5IN TURQ POLYPR HUB S STL THN WALL IM

## (undated) DEVICE — HYPODERMIC SAFETY NEEDLE: Brand: MONOJECT

## (undated) DEVICE — PADDING CAST W6INXL4YD COT LO LINTING WYTEX

## (undated) DEVICE — DRAPE,U/ SHT,SPLIT,PLAS,STERIL: Brand: MEDLINE

## (undated) DEVICE — GLOVE SURG SZ 8 CRM LTX FREE POLYISOPRENE POLYMER BEAD ANTI

## (undated) DEVICE — TOWEL,OR,DSP,ST,BLUE,STD,4/PK,20PK/CS: Brand: MEDLINE

## (undated) DEVICE — SUTURE VICRYL + SZ 2-0 L18IN ABSRB UD CT1 L36MM 1/2 CIR VCP839D

## (undated) DEVICE — PADDING,UNDERCAST,COTTON, 4"X4YD STERILE: Brand: MEDLINE